# Patient Record
Sex: FEMALE | Race: WHITE | NOT HISPANIC OR LATINO | Employment: FULL TIME | URBAN - METROPOLITAN AREA
[De-identification: names, ages, dates, MRNs, and addresses within clinical notes are randomized per-mention and may not be internally consistent; named-entity substitution may affect disease eponyms.]

---

## 2023-02-06 PROBLEM — E03.9 ADULT HYPOTHYROIDISM: Status: ACTIVE | Noted: 2023-02-06

## 2023-02-06 PROBLEM — N93.8 DUB (DYSFUNCTIONAL UTERINE BLEEDING): Status: ACTIVE | Noted: 2023-02-06

## 2023-02-06 PROBLEM — E66.813 OBESITY, CLASS III, BMI 40-49.9 (MORBID OBESITY): Status: ACTIVE | Noted: 2023-02-06

## 2023-02-06 PROBLEM — E55.9 VITAMIN D DEFICIENCY: Status: ACTIVE | Noted: 2023-02-06

## 2023-02-06 PROBLEM — N97.0 INFERTILITY ASSOCIATED WITH ANOVULATION: Status: ACTIVE | Noted: 2023-02-06

## 2023-02-06 PROBLEM — N85.00 ENDOMETRIAL HYPERPLASIA: Status: ACTIVE | Noted: 2023-02-06

## 2023-02-06 PROBLEM — I82.409 DVT (DEEP VENOUS THROMBOSIS) (MULTI): Status: ACTIVE | Noted: 2023-02-06

## 2023-02-06 PROBLEM — T14.8XXA PULLED MUSCLE: Status: ACTIVE | Noted: 2023-02-06

## 2023-02-06 PROBLEM — N92.1 MENORRHAGIA WITH IRREGULAR CYCLE: Status: ACTIVE | Noted: 2023-02-06

## 2023-02-06 PROBLEM — R06.09 DYSPNEA ON EXERTION: Status: ACTIVE | Noted: 2023-02-06

## 2023-02-06 PROBLEM — R10.9 ABDOMINAL PAIN: Status: ACTIVE | Noted: 2023-02-06

## 2023-02-06 PROBLEM — F41.8 DEPRESSION WITH ANXIETY: Status: ACTIVE | Noted: 2023-02-06

## 2023-02-06 PROBLEM — E66.01 OBESITY, CLASS III, BMI 40-49.9 (MORBID OBESITY) (MULTI): Status: ACTIVE | Noted: 2023-02-06

## 2023-02-06 PROBLEM — E28.2 PCOS (POLYCYSTIC OVARIAN SYNDROME): Status: ACTIVE | Noted: 2023-02-06

## 2023-02-06 PROBLEM — N91.5 OLIGOMENORRHEA: Status: ACTIVE | Noted: 2023-02-06

## 2023-02-06 PROBLEM — D64.9 ANEMIA: Status: ACTIVE | Noted: 2023-02-06

## 2023-02-06 PROBLEM — I26.99 MULTIPLE PULMONARY EMBOLI (MULTI): Status: ACTIVE | Noted: 2023-02-06

## 2023-02-06 PROBLEM — R00.0 TACHYCARDIA: Status: ACTIVE | Noted: 2023-02-06

## 2023-02-06 PROBLEM — N92.6 IRREGULAR MENSES: Status: ACTIVE | Noted: 2023-02-06

## 2023-02-06 RX ORDER — METFORMIN HYDROCHLORIDE 500 MG/1
TABLET, EXTENDED RELEASE ORAL
COMMUNITY
Start: 2022-08-15 | End: 2023-10-27 | Stop reason: ALTCHOICE

## 2023-02-06 RX ORDER — ENOXAPARIN SODIUM 150 MG/ML
INJECTION SUBCUTANEOUS
COMMUNITY
Start: 2022-08-08 | End: 2023-10-27 | Stop reason: ALTCHOICE

## 2023-02-06 RX ORDER — PNV 119/IRON FUM/FOLIC ACID 29 MG-1 MG
TABLET ORAL
COMMUNITY
Start: 2019-11-14

## 2023-02-06 RX ORDER — ALBUTEROL SULFATE 90 UG/1
AEROSOL, METERED RESPIRATORY (INHALATION)
COMMUNITY
Start: 2019-09-25

## 2023-02-06 RX ORDER — NORETHINDRONE 5 MG/1
1 TABLET ORAL 3 TIMES DAILY
COMMUNITY
Start: 2022-03-30 | End: 2023-10-27 | Stop reason: ALTCHOICE

## 2023-02-06 RX ORDER — MEDROXYPROGESTERONE ACETATE 10 MG/1
1 TABLET ORAL DAILY
COMMUNITY
Start: 2022-08-08 | End: 2023-10-27 | Stop reason: ALTCHOICE

## 2023-04-18 RX ORDER — PROGESTERONE 100 MG/1
CAPSULE ORAL
COMMUNITY
End: 2023-10-27 | Stop reason: ALTCHOICE

## 2023-04-18 RX ORDER — ESTRADIOL 1 MG/1
1 TABLET ORAL DAILY
COMMUNITY
Start: 2023-03-03 | End: 2023-10-27 | Stop reason: ALTCHOICE

## 2023-04-18 RX ORDER — PROGESTERONE 200 MG/1
1 CAPSULE ORAL NIGHTLY
COMMUNITY
Start: 2023-03-09 | End: 2023-10-27 | Stop reason: ALTCHOICE

## 2023-04-25 ENCOUNTER — OFFICE VISIT (OUTPATIENT)
Dept: PRIMARY CARE | Facility: CLINIC | Age: 43
End: 2023-04-25
Payer: COMMERCIAL

## 2023-04-25 VITALS
DIASTOLIC BLOOD PRESSURE: 70 MMHG | HEIGHT: 68 IN | TEMPERATURE: 98.3 F | WEIGHT: 293 LBS | BODY MASS INDEX: 44.41 KG/M2 | OXYGEN SATURATION: 99 % | SYSTOLIC BLOOD PRESSURE: 110 MMHG | HEART RATE: 109 BPM

## 2023-04-25 DIAGNOSIS — E66.01 OBESITY, CLASS III, BMI 40-49.9 (MORBID OBESITY) (MULTI): ICD-10-CM

## 2023-04-25 DIAGNOSIS — E28.2 PCOS (POLYCYSTIC OVARIAN SYNDROME): ICD-10-CM

## 2023-04-25 DIAGNOSIS — Z12.11 ENCOUNTER FOR SCREENING FOR MALIGNANT NEOPLASM OF COLON: ICD-10-CM

## 2023-04-25 DIAGNOSIS — E55.9 VITAMIN D DEFICIENCY: ICD-10-CM

## 2023-04-25 DIAGNOSIS — D50.9 IRON DEFICIENCY ANEMIA, UNSPECIFIED IRON DEFICIENCY ANEMIA TYPE: ICD-10-CM

## 2023-04-25 DIAGNOSIS — F41.8 DEPRESSION WITH ANXIETY: ICD-10-CM

## 2023-04-25 DIAGNOSIS — Z00.00 ANNUAL PHYSICAL EXAM: Primary | ICD-10-CM

## 2023-04-25 DIAGNOSIS — E03.9 ADULT HYPOTHYROIDISM: ICD-10-CM

## 2023-04-25 DIAGNOSIS — Z12.31 BREAST CANCER SCREENING BY MAMMOGRAM: ICD-10-CM

## 2023-04-25 DIAGNOSIS — I26.99 MULTIPLE PULMONARY EMBOLI (MULTI): ICD-10-CM

## 2023-04-25 LAB
CALCIDIOL (25 OH VITAMIN D3) (NG/ML) IN SER/PLAS: 28 NG/ML
THYROTROPIN (MIU/L) IN SER/PLAS BY DETECTION LIMIT <= 0.05 MIU/L: 1.33 MIU/L (ref 0.44–3.98)

## 2023-04-25 PROCEDURE — 83540 ASSAY OF IRON: CPT

## 2023-04-25 PROCEDURE — 83036 HEMOGLOBIN GLYCOSYLATED A1C: CPT

## 2023-04-25 PROCEDURE — 80053 COMPREHEN METABOLIC PANEL: CPT

## 2023-04-25 PROCEDURE — 84443 ASSAY THYROID STIM HORMONE: CPT

## 2023-04-25 PROCEDURE — 85027 COMPLETE CBC AUTOMATED: CPT

## 2023-04-25 PROCEDURE — 99396 PREV VISIT EST AGE 40-64: CPT | Performed by: NURSE PRACTITIONER

## 2023-04-25 PROCEDURE — 80061 LIPID PANEL: CPT

## 2023-04-25 PROCEDURE — 82306 VITAMIN D 25 HYDROXY: CPT

## 2023-04-25 PROCEDURE — 36415 COLL VENOUS BLD VENIPUNCTURE: CPT | Performed by: NURSE PRACTITIONER

## 2023-04-25 ASSESSMENT — PAIN SCALES - GENERAL: PAINLEVEL: 0-NO PAIN

## 2023-04-25 NOTE — PROGRESS NOTES
Subjective   Patient ID: Radha Gomez is a 42 y.o. female who presents for Annual Exam.    HPI     Review of Systems    Objective   LMP 02/25/2023     Physical Exam    Assessment/Plan

## 2023-04-25 NOTE — PROGRESS NOTES
"Subjective   Patient ID: Radha Gomez is a 42 y.o. female who presents for Annual Exam.    HPI  Pleasant established patient here with her  for Annual exam  New c/o hemorrhoids, can be uncomfortable and bleeding at times, currently not a concern, interested in ways to manage. Low fiber diet, mostly take out. Water 32 oz, little to no exercise    Reports mom was recently diagnosed with Dementia and has declined rapidly over the last 4 months.     Currently pursuing IVF, have decided not to pursue letrozole due to risk of congenital defects. It was recommended she begin a weight loss program for better chances of a healthy pregnancy. Discussed healthy meal options, motivation with starting with small changes. Plate method and adding water and movement to her days. Also discussed planning in advance.  Will refer to Nutritionist, she will take a list of meals over a two week period.     Follows with Reproductive Endocrinology, Hematology for Hx of PE taking Lovenox      Works remotely    Diet mostly take out, not motivated to cook  Caffeine drinks soda  Water 32 oz  Exercise no  Non-smoker  Alcohol No      Eye exam >5   Dental exam 2022  Mammogram due      Review of Systems  Review of Systems   Constitutional: Negative.    HENT: Negative.     Respiratory: Negative.     Cardiovascular: Negative.    Gastrointestinal: Negative.    Genitourinary: Negative.    Musculoskeletal: Negative.    Psychiatric/Behavioral: Negative.     All other systems reviewed and are negative.    .vsVisit Vitals  /70 (BP Location: Left arm, Patient Position: Sitting, BP Cuff Size: Large adult)   Pulse 109   Temp 36.8 °C (98.3 °F) (Temporal)   Ht 1.727 m (5' 8\")   Wt 137 kg (302 lb 12.8 oz)   LMP 02/25/2023   SpO2 99%   BMI 46.04 kg/m²   OB Status Having periods   Smoking Status Never Assessed   BSA 2.56 m²         Objective   Physical Exam  Physical Exam  Vitals reviewed.   Constitutional:       General: She is active. "   HENT:      Head: Normocephalic and atraumatic.   Eyes:      Extraocular Movements: Extraocular movements intact.      Pupils: Pupils are equal, round, and reactive to light.   Cardiovascular:      Rate and Rhythm: Normal rate and regular rhythm.   Pulmonary:      Effort: Pulmonary effort is normal.      Breath sounds: Normal breath sounds.   Abdominal:      General: Bowel sounds are normal.   Musculoskeletal:         General: Normal range of motion.      Cervical back: Neck supple.   Skin:     General: Skin is warm and dry.      Capillary Refill: Capillary refill takes less than 2 seconds.   Neurological:      General: No focal deficit present.      Mental Status: She is alert.     Assessment/Plan   Problem List Items Addressed This Visit       Adult hypothyroidism    Relevant Orders    TSH with reflex to Free T4 if abnormal    Depression with anxiety    Multiple pulmonary emboli (CMS/HCC)    Obesity, Class III, BMI 40-49.9 (morbid obesity) (CMS/HCC)    Relevant Orders    Hemoglobin A1C    Lipid Panel    Referral to Nutrition Services    PCOS (polycystic ovarian syndrome)    Relevant Orders    Hemoglobin A1C    Referral to Nutrition Services    Vitamin D deficiency    Relevant Orders    Vitamin D, Total    Anemia    Relevant Orders    Iron level    Annual physical exam - Primary    Relevant Orders    CBC    Comprehensive Metabolic Panel    Vitamin D, Total    TSH with reflex to Free T4 if abnormal    Hemoglobin A1C    Lipid Panel     Other Visit Diagnoses       Encounter for screening for malignant neoplasm of colon        Breast cancer screening by mammogram        Relevant Orders    BI mammo bilateral screening tomosynthesis

## 2023-04-25 NOTE — PATIENT INSTRUCTIONS
Constipation  Though occasional constipation is very common, some people experience chronic constipation that can interfere with their ability to go about their daily tasks.   Chronic constipation may also cause people to strain excessively in order to have a bowel movement.    Symptoms    Signs and symptoms of chronic constipation include:  Passing fewer than three stools a week   Having lumpy or hard stools   Straining to have bowel movements   Feeling as though there's a blockage in your rectum that prevents bowel movements   Feeling as though you can't completely empty the stool from your rectum   Needing help to empty your rectum, such as using your hands to press on your abdomen and using a finger to remove stool from your rectum  Constipation may be considered chronic if you've experienced two or more of these symptoms for the last three months.    Risk factors    Being an older adult   Being a woman   Being dehydrated   Eating a diet that's low in fiber   Getting little or no physical activity   Taking certain medications, including sedatives, opioid pain medications, some antidepressants or medications to lower blood pressure   Having a mental health condition such as depression or an eating disorder    Prevention    Include plenty of high-fiber foods in your diet, including beans, vegetables, fruits, whole grain cereals and bran.   Eat fewer foods with low amounts of fiber such as processed foods, and dairy and meat products.   Drink plenty of fluids.   Stay as active as possible and try to get regular exercise.   Try to manage stress.   Don't ignore the urge to pass stool.   Try to create a regular schedule for bowel movements, especially after a meal.   Make sure children who begin to eat solid foods get plenty of fiber in their diets.     offers a free caregiver course for people taking care of family members with dementia  It is called the Savvy Caregiver and will teach you about dementia, the  changes that come with it and how to manage them both physically and emotionally.  Classes are 2 hours once a week for 6 weeks.   Call [phone]    Useful resources  [facility]   [phone]    [facility] Division of Adult/Senior Services   [phone]    [employer]   [phone]    [facility]  [phone]    [facility]  [phone]

## 2023-04-26 LAB
ALANINE AMINOTRANSFERASE (SGPT) (U/L) IN SER/PLAS: 40 U/L (ref 7–45)
ALBUMIN (G/DL) IN SER/PLAS: 4.6 G/DL (ref 3.4–5)
ALKALINE PHOSPHATASE (U/L) IN SER/PLAS: 52 U/L (ref 33–110)
ANION GAP IN SER/PLAS: 15 MMOL/L (ref 10–20)
ASPARTATE AMINOTRANSFERASE (SGOT) (U/L) IN SER/PLAS: 21 U/L (ref 9–39)
BILIRUBIN TOTAL (MG/DL) IN SER/PLAS: 0.7 MG/DL (ref 0–1.2)
CALCIUM (MG/DL) IN SER/PLAS: 9.6 MG/DL (ref 8.6–10.6)
CARBON DIOXIDE, TOTAL (MMOL/L) IN SER/PLAS: 21 MMOL/L (ref 21–32)
CHLORIDE (MMOL/L) IN SER/PLAS: 108 MMOL/L (ref 98–107)
CHOLESTEROL (MG/DL) IN SER/PLAS: 159 MG/DL (ref 0–199)
CHOLESTEROL IN HDL (MG/DL) IN SER/PLAS: 28.4 MG/DL
CHOLESTEROL/HDL RATIO: 5.6
CREATININE (MG/DL) IN SER/PLAS: 0.88 MG/DL (ref 0.5–1.05)
ERYTHROCYTE DISTRIBUTION WIDTH (RATIO) BY AUTOMATED COUNT: 17.3 % (ref 11.5–14.5)
ERYTHROCYTE MEAN CORPUSCULAR HEMOGLOBIN CONCENTRATION (G/DL) BY AUTOMATED: 30.7 G/DL (ref 32–36)
ERYTHROCYTE MEAN CORPUSCULAR VOLUME (FL) BY AUTOMATED COUNT: 89 FL (ref 80–100)
ERYTHROCYTES (10*6/UL) IN BLOOD BY AUTOMATED COUNT: 4.71 X10E12/L (ref 4–5.2)
ESTIMATED AVERAGE GLUCOSE FOR HBA1C: 88 MG/DL
GFR FEMALE: 84 ML/MIN/1.73M2
GLUCOSE (MG/DL) IN SER/PLAS: 100 MG/DL (ref 74–99)
HEMATOCRIT (%) IN BLOOD BY AUTOMATED COUNT: 42 % (ref 36–46)
HEMOGLOBIN (G/DL) IN BLOOD: 12.9 G/DL (ref 12–16)
HEMOGLOBIN A1C/HEMOGLOBIN TOTAL IN BLOOD: 4.7 %
IRON (UG/DL) IN SER/PLAS: 45 UG/DL (ref 35–150)
LDL: ABNORMAL MG/DL (ref 0–99)
LEUKOCYTES (10*3/UL) IN BLOOD BY AUTOMATED COUNT: 6.2 X10E9/L (ref 4.4–11.3)
NRBC (PER 100 WBCS) BY AUTOMATED COUNT: 0 /100 WBC (ref 0–0)
PLATELETS (10*3/UL) IN BLOOD AUTOMATED COUNT: 230 X10E9/L (ref 150–450)
POTASSIUM (MMOL/L) IN SER/PLAS: 4.3 MMOL/L (ref 3.5–5.3)
PROTEIN TOTAL: 6.7 G/DL (ref 6.4–8.2)
SODIUM (MMOL/L) IN SER/PLAS: 140 MMOL/L (ref 136–145)
TRIGLYCERIDE (MG/DL) IN SER/PLAS: 591 MG/DL (ref 0–149)
UREA NITROGEN (MG/DL) IN SER/PLAS: 11 MG/DL (ref 6–23)
VLDL: ABNORMAL MG/DL (ref 0–40)

## 2023-05-02 DIAGNOSIS — E55.9 VITAMIN D DEFICIENCY: Primary | ICD-10-CM

## 2023-05-02 RX ORDER — ERGOCALCIFEROL 1.25 MG/1
50000 CAPSULE ORAL
Qty: 4 CAPSULE | Refills: 0 | Status: SHIPPED | OUTPATIENT
Start: 2023-05-02 | End: 2023-06-27

## 2023-09-14 LAB
HEPATITIS B VIRUS SURFACE AG PRESENCE IN SERUM: NONREACTIVE
HEPATITIS C VIRUS AB PRESENCE IN SERUM: NONREACTIVE
HIV 1/ 2 AG/AB SCREEN: NONREACTIVE

## 2023-09-15 LAB — SYPHILIS TOTAL AB: NONREACTIVE

## 2023-09-19 LAB — ANTI-MULLERIAN HORMONE (AMH): 4.85 NG/ML

## 2023-09-21 PROBLEM — Z00.00 ANNUAL PHYSICAL EXAM: Status: RESOLVED | Noted: 2023-04-25 | Resolved: 2023-09-21

## 2023-09-21 PROBLEM — T14.8XXA PULLED MUSCLE: Status: RESOLVED | Noted: 2023-02-06 | Resolved: 2023-09-21

## 2023-09-21 PROBLEM — R10.9 ABDOMINAL PAIN: Status: RESOLVED | Noted: 2023-02-06 | Resolved: 2023-09-21

## 2023-09-21 RX ORDER — ASPIRIN 325 MG
50000 TABLET, DELAYED RELEASE (ENTERIC COATED) ORAL WEEKLY
COMMUNITY
End: 2023-10-27 | Stop reason: ALTCHOICE

## 2023-09-21 RX ORDER — ENOXAPARIN SODIUM 100 MG/ML
INJECTION SUBCUTANEOUS
COMMUNITY
Start: 2019-06-07 | End: 2023-10-27 | Stop reason: ALTCHOICE

## 2023-09-21 RX ORDER — DOCUSATE SODIUM 100 MG/1
CAPSULE, LIQUID FILLED ORAL
COMMUNITY
Start: 2015-07-10

## 2023-09-21 RX ORDER — FERROUS SULFATE 325(65) MG
TABLET, DELAYED RELEASE (ENTERIC COATED) ORAL
COMMUNITY
Start: 2015-07-10

## 2023-09-21 RX ORDER — MEDROXYPROGESTERONE ACETATE 5 MG/1
TABLET ORAL
COMMUNITY
Start: 2019-10-28 | End: 2023-10-27 | Stop reason: ALTCHOICE

## 2023-09-21 RX ORDER — LEVOTHYROXINE SODIUM 75 UG/1
TABLET ORAL
COMMUNITY
End: 2023-10-27 | Stop reason: ALTCHOICE

## 2023-10-10 ENCOUNTER — PHARMACY VISIT (OUTPATIENT)
Dept: PHARMACY | Facility: CLINIC | Age: 43
End: 2023-10-10
Payer: COMMERCIAL

## 2023-10-10 PROCEDURE — RXMED WILLOW AMBULATORY MEDICATION CHARGE

## 2023-10-20 ENCOUNTER — APPOINTMENT (OUTPATIENT)
Dept: ENDOCRINOLOGY | Facility: CLINIC | Age: 43
End: 2023-10-20
Payer: COMMERCIAL

## 2023-10-27 ENCOUNTER — OFFICE VISIT (OUTPATIENT)
Dept: PRIMARY CARE | Facility: CLINIC | Age: 43
End: 2023-10-27
Payer: COMMERCIAL

## 2023-10-27 VITALS
DIASTOLIC BLOOD PRESSURE: 80 MMHG | SYSTOLIC BLOOD PRESSURE: 130 MMHG | BODY MASS INDEX: 46.19 KG/M2 | OXYGEN SATURATION: 97 % | HEART RATE: 118 BPM | WEIGHT: 293 LBS | TEMPERATURE: 97.6 F

## 2023-10-27 DIAGNOSIS — E66.01 MORBID OBESITY (MULTI): ICD-10-CM

## 2023-10-27 DIAGNOSIS — F41.8 DEPRESSION WITH ANXIETY: ICD-10-CM

## 2023-10-27 DIAGNOSIS — Z91.89 AT RISK FOR COPING DIFFICULTY: Primary | ICD-10-CM

## 2023-10-27 DIAGNOSIS — N97.0 INFERTILITY ASSOCIATED WITH ANOVULATION: ICD-10-CM

## 2023-10-27 PROCEDURE — 1036F TOBACCO NON-USER: CPT | Performed by: NURSE PRACTITIONER

## 2023-10-27 PROCEDURE — 3008F BODY MASS INDEX DOCD: CPT | Performed by: NURSE PRACTITIONER

## 2023-10-27 PROCEDURE — 99214 OFFICE O/P EST MOD 30 MIN: CPT | Performed by: NURSE PRACTITIONER

## 2023-10-27 ASSESSMENT — ENCOUNTER SYMPTOMS
SHORTNESS OF BREATH: 0
EYES NEGATIVE: 1
PALPITATIONS: 0
CHILLS: 0
ALLERGIC/IMMUNOLOGIC NEGATIVE: 1
ENDOCRINE NEGATIVE: 1
FATIGUE: 0
GASTROINTESTINAL NEGATIVE: 1
DEPRESSION: 0
MUSCULOSKELETAL NEGATIVE: 1
NEUROLOGICAL NEGATIVE: 1
COUGH: 0
FEVER: 0
HEMATOLOGIC/LYMPHATIC NEGATIVE: 1

## 2023-10-27 ASSESSMENT — PAIN SCALES - GENERAL: PAINLEVEL: 0-NO PAIN

## 2023-10-27 NOTE — PATIENT INSTRUCTIONS
You are not alone  Everyone has struggles in life  Many people are out there to listen, and help, and not  you  Never be afraid to ask    Here is a list of Mental Health Resources;    Suicide and Crisis Hotline  DIAL 988    Mobile Crisis  [phone]   or Text 4HOPE to 707174    Suicide Prevention Lifeline  1-101.345.1426    Recovery Resources   [address]  [phone]  Substance Abuse  Mental Health  Psychiatric Emergency Walk In clinic    [facility]  [address]   [phone]   Substance abuse   Psychiatric Emergency Walk In clinic      Top rated On Line Resources; Just Google the name for the link to follow    [employer]     [employer]    Teen Counseling    Pride Counseling     Grief is universal. At some point in everyone's life, there will be at least one encounter with grief.   It may be from the death of a loved one, the loss of a job, the end of a relationship, or any other change that alters life as you know it.    Grief is also very personal. It's not very neat or linear. It doesn't follow any timelines or schedules.  You may cry, become angry, withdraw, feel empty.   None of these things are unusual or wrong.   Everyone grieves differently, but there are some commonalities in the stages and the order of feelings experienced during grief.    The five stages of grief are:  denial  anger  bargaining  depression  acceptance    Not everyone will experience all five stages, and you may not go through them in this order.      Stage 1: Denial  Grief is an overwhelming emotion. It's not unusual to respond to the intense and often sudden feelings by pretending the loss or change isn't happening. Denying it gives you time to more gradually absorb the news and begin to process it. This is a common defense mechanism and helps numb you to the intensity of the situation.    As you move out of the denial stage, however, the emotions you've been hiding will begin to  "rise. You'll be confronted with a lot of sorrow you've denied. That is also part of the journey of grief, but it can be difficult.    Stage 2: Anger  Where denial may be considered a coping mechanism, anger is a masking effect. Anger is hiding many of the emotions and pain that you carry. This anger may be redirected at other people, such as the person who , your ex, or your old boss. You may even aim your anger at inanimate objects.    While your rational brain knows the object of your anger isn't to blame, your feelings in that moment are too intense to feel that.    Anger may mask itself in feelings like bitterness or resentment. It may not be clear-cut fury or rage. Not everyone will experience this stage, and some may linger here. As the anger subsides, however, you may begin to think more rationally about what's happening and feel the emotions you've been pushing aside.    Stage 3: Bargaining  During grief, you may feel vulnerable and helpless. In those moments of intense emotions, it's not uncommon to look for ways to regain control or to want to feel like you can affect the outcome of an event. In the bargaining stage of grief, you may find yourself creating a lot of \"what if\" and \"if only\" statements.    It's also not uncommon for [employer] individuals to try to make a deal or promise to God or a higher power in return for healing or relief from the grief and pain. Bargaining is a line of defense against the emotions of grief. It helps you postpone the sadness, confusion, or hurt.    Stage 4: Depression  Whereas anger and bargaining can feel very \"active,\" depression may feel like a \"quiet\" stage of grief.    In the early stages of loss, you may be running from the emotions, trying to stay a step ahead of them. By this point, however, you may be able to embrace and work through them in a more healthful manner. You may also choose to isolate yourself from others in order to fully cope with the " "loss.    That doesn't mean, however, that depression is easy or well defined. Like the other stages of grief, depression can be difficult and messy. It can feel overwhelming. You may feel foggy, heavy, and confused.    Depression may feel like the inevitable landing point of any loss. However, if you feel stuck here or can't seem to move past this stage of grief, talk with a mental health expert. A therapist can help you work through this period of coping.    Stage 5: Acceptance  Acceptance is not necessarily a happy or uplifting stage of grief. It doesn't mean you've moved past the grief or loss. It does, however, mean that you've accepted it and have come to understand what it means in your life now.    You may feel very different in this stage. That's entirely expected. You've had a major change in your life, and that upends the way you feel about many things. Look to acceptance as a way to see that there may be more good days than bad, but there may still be bad - and that's OK    Where did the stages of grief come from?  In 1969, a [place]-American psychiatrist named Beryl Madrid wrote in her book \"On Death and Dying\" that grief could be divided into five stages. Her observations came from years of working with terminally ill individuals.  Her theory of grief became known as the Aj-Benoit model. While it was originally devised for people who were ill, these stages of grief have been adapted for other experiences with loss, too.    "

## 2023-10-27 NOTE — PROGRESS NOTES
I was present with the APRN student who participated in the documentation of this note. I have personally seen and re-examined the patient and performed the medical decision-making components (assessment and plan of care). I have reviewed the APRN student documentation and verified the findings in the note as written with additions or exceptions as stated in the body of this note  Sanaz Danielson [facility]

## 2023-10-27 NOTE — PROGRESS NOTES
Subjective   Patient ID: Radha Gomez is a 42 y.o. female who presents for Follow-up (6mo FUV).    HPI     Pleasant 42 y.o. female with past medical history for PCOS, infertility, and depression with anxiety presents for 6 month follow up.    Infertility - Continues pursuing IVF. IVF retrieval requires BMI = 44 or less. Current BMI 46. Saw dietician, unable to locate records. Reports she has all the tools she just needs to use them.  Discussed healthy meal options and gave her information on the mediterranean diet. Discussed reducing carbohydrate intake and implementing more vegetables and fruit in her diet. Also discussed meal prepping in advance. She doesn't get much exercise besides walking at work. Recommended walking/exercising and using 5 lb hand weights most days of the week.    Depression with anxiety - She is experiencing some anxiety and depression from losing her mom due to dementia this past year, trouble coping. Has good support system.  She denies SI or HI. Recommended referral to psychology to help with coping, grief, medical process.     Review of Systems   Constitutional:  Negative for chills, fatigue and fever.   HENT: Negative.     Eyes: Negative.    Respiratory:  Negative for cough and shortness of breath.    Cardiovascular:  Negative for chest pain and palpitations.   Gastrointestinal: Negative.    Endocrine: Negative.    Genitourinary: Negative.    Musculoskeletal: Negative.    Skin: Negative.    Allergic/Immunologic: Negative.    Neurological: Negative.    Hematological: Negative.    Psychiatric/Behavioral:          HPI       Objective   /80 (BP Location: Left arm, Patient Position: Sitting, BP Cuff Size: Adult)   Pulse (!) 118   Temp 36.4 °C (97.6 °F) (Temporal)   Wt 142 kg (312 lb 12.8 oz)   LMP 10/21/2023 (Approximate)   SpO2 97%   BMI 46.19 kg/m²     Physical Exam  Constitutional:       Appearance: Normal appearance. She is obese.   Cardiovascular:      Rate and Rhythm:  Normal rate and regular rhythm.      Pulses: Normal pulses.      Heart sounds: Normal heart sounds.   Pulmonary:      Effort: Pulmonary effort is normal.      Breath sounds: Normal breath sounds.   Neurological:      Mental Status: She is alert and oriented to person, place, and time.   Psychiatric:      Comments: Crying, depressed, and anxious         Assessment/Plan   Problem List Items Addressed This Visit             ICD-10-CM    Depression with anxiety F41.8    Relevant Orders    Referral to Psychology    Infertility associated with anovulation N97.0    Relevant Orders    Referral to Psychology  Recommended mediteraanean diet   Recommended meal prepping  Recommended walking and exercising     Other Visit Diagnoses         Codes    At risk for coping difficulty    -  Primary Z91.89    Relevant Orders    Referral to Psychology          BMI 46  Follows with Nutrition  Handouts provided and reviewed with patient, discussed

## 2023-11-03 ENCOUNTER — PHARMACY VISIT (OUTPATIENT)
Dept: PHARMACY | Facility: CLINIC | Age: 43
End: 2023-11-03
Payer: COMMERCIAL

## 2023-11-03 DIAGNOSIS — I26.99 MULTIPLE PULMONARY EMBOLI (MULTI): Primary | ICD-10-CM

## 2023-11-03 PROCEDURE — RXMED WILLOW AMBULATORY MEDICATION CHARGE

## 2023-11-03 RX ORDER — ENOXAPARIN SODIUM 150 MG/ML
INJECTION SUBCUTANEOUS
Qty: 60 ML | Refills: 6 | Status: SHIPPED | OUTPATIENT
Start: 2023-11-03 | End: 2024-11-02

## 2023-11-10 ENCOUNTER — SOCIAL WORK (OUTPATIENT)
Dept: BEHAVIORAL HEALTH | Facility: CLINIC | Age: 43
End: 2023-11-10
Payer: COMMERCIAL

## 2023-11-10 DIAGNOSIS — Z91.89 AT RISK FOR COPING DIFFICULTY: ICD-10-CM

## 2023-11-10 DIAGNOSIS — F41.8 DEPRESSION WITH ANXIETY: ICD-10-CM

## 2023-11-10 DIAGNOSIS — F41.1 GAD (GENERALIZED ANXIETY DISORDER): ICD-10-CM

## 2023-11-10 DIAGNOSIS — F33.2 SEVERE EPISODE OF RECURRENT MAJOR DEPRESSIVE DISORDER, WITHOUT PSYCHOTIC FEATURES (MULTI): ICD-10-CM

## 2023-11-10 DIAGNOSIS — N97.0 INFERTILITY ASSOCIATED WITH ANOVULATION: ICD-10-CM

## 2023-11-10 PROCEDURE — 90791 PSYCH DIAGNOSTIC EVALUATION: CPT

## 2023-11-10 NOTE — PROGRESS NOTES
Solution-Focused Therapy Initial Assessment    Radha oGmez, a 42 y.o. female, for initial evaluation visit.  Patient is referred by PCP, KP Herrera    Session Time:   Start: 9:03 AM  End: 10:15 AM  Accompanied by: Self    Reason for Visit / Chief Complaint  Chief Complaint   Patient presents with    Psychiatric Evaluation     Reason for Referral    Pt currently experiencing Anxiety , Depression, infertility, and health issues, with reported history of Suicidal Ideation   Managed by Medication? No      History (Timeline of symptom onset and Family)  PCP was concerned that pt not handling stress well.  Mom, who was pt's main support,  in May, had Dementia, got firm diagnosis in Feb, started declining fast.  Was in a facility, pt and fam were working on getting her home when passed away.  Pt thought she was coping well, going through grief stages, but realizes she may not be doing that well.  Now lacking support--talking to  and/or friend isn't the same.  Additionally, pt struggling with infertility for about 4 years, been trying to conceive for 6 years, and work for  is stressful.     Pt reports suicidal ideation as recent as Saturday--sometimes feels like there's no recourse, nothing will help, existence is stupid, but then start thinking about people in life, distracts by shopping and such.  Is mostly passive ideation, but has had the same idea for a plan for years--would use her anticoagulant and walk far from where people would find her and cut herself, and let time do its thing.  Ideation goes back to beginning of high school.  Have always just moved on, though.  Pt feels it gives her some control to say “here's what I will do if it hurts too much”.  Her responsibility to others stops her, knows it would hurt other people for her to hurt herself, is future thinking-- committed to wanting a child.    Pt reports:  Sleep Disturbance:  Low Energy:  Frequent Headaches:  Chest  Pains/Palpitations:  Anxious/Tense/Worried: Depressed/Sad:  Angry/Irritable:  Social Withdrawal:  Crying Spells:  Hopelessness:  Poor Self-Image/Guilt:  Thoughts of Death or Dying:  Obsessions:      Pt has a long history of suicide ideation, with protective factors.  Has done therapy before--when 2017 or 19, expressed to  that if they can't have children, pt had a plan to end it all.  So, pt went and talked to therapist, who pt feels “turned it back on me” to find solutions, felt like she was failing him bc she was not coming up with solutions, so stopped therapy.  No meds.      Maternal & Sibling mental and behavioral health history of Bipolar Disorder , Alcohol Abuse.    Present stressors:   Mother's recent passing, infertility, other health issues, lack of full support system              Pt would like to work on:    Not needing counseling--“to have more days of wanting to do the day”, as opposed to going back to bed.       PSYCHOSOCIAL HISTORIES    CURRENT LIVING SITUATION   Immediate household/family:  Lives with , James, and 2 cats--Connor, and Admiral Cullen KATEGeovanny Mayo III.   for 6 years, together for 12.    Family and Social support network:  , father, friend, but she knows she has a hard time showing emotion.  Father is like her, not very expressive     Sikh/Spiritual orientation:   None, really. Raised Orthodox.  Researched other religions, would classify self as Rosas.  [employer] partner “ghosted” her during pandemic, has felt disconnected.  Not pursuing right now.    Gender Identity/Pronouns/Sexual orientation:  She/her; has PCOS--can make her think she is not who she knows herself to be. Don't feel “normal”, would like to be “normal”.  Diagnosed about 8 years ago.    Recent losses or deaths:  Mom passed due to dementia in May.  Was an intellectual, so “probably for the best” that it happened quickly.  A role-playing online friend drowned around same time as mom's  passing as well.      MENTAL HEALTH HISTORY     What precipitated this most recent episode?   PCP was concerned that pt not handling stress well.  Mom, who was pt's main support,  in May, had Dementia, got firm diagnosis in Feb, started declining fast.  Was in a facility, pt and fam were working on getting her home when passed away.  Pt thought she was coping well, going through grief stages, but realizes she may not be doing that well.  Now lacking support--talking to  and/or friend isn't the same.  Additionally, pt struggling with infertility for about 4 years, been trying to conceive for 6 years, and work for  is stressful.      Additionally, pt upset that her  is not as supportive as she would like him to be.  Has made side comments about her eating habits--pt has to lose weight for infertility treatment.    Current Providers?    None:      Prior mental health treatment:  Has done therapy before--when  or 19, expressed to  that if they can't have children, pt had a plan to end it all.  So, pt went and talked to therapist, who pt feels “turned it back on me” to find solutions, felt like she was failing him bc she was not coming up with solutions, so stopped therapy.  No meds.      Acute mental health symptoms:  Suicidal Ideation:  Ideation as recent as Saturday--sometimes feels like there's no recourse, nothing will help, existence is stupid, but then start thinking about people in life, distracts by shopping and such.  Has had the same plan for years--would use her anticoagulant and walk far from where people would find her and cut herself, and let time do its thing.  Ideation goes back to beginning of high school.  Have always just moved on, though.  Pt feels it gives her some control to say “here's what I will do if it hurts too much”.  Her responsibility to others stops her, knows it would hurt other people for her to hurt herself, is future thinking-- committed to wanting a  child.  Homicidal Ideation:  None reported.  Psychosis:  None reported.      SIGNIFICANT MEDICAL HX?  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  To go forward with IVF, need to be put under.  To do that in office, have to be under a certain BMI--pt is 10 lbs from being able to do that.   Can feel issues/stiffness in her joints, would like to be more active and energetic.  Have always been bigger, knows she should get in better shape.      Illness/Mental Health impacts functioning?  Yes  Pt reports:  Current Somatic Symptoms:   Sleep Disturbance:   Always had difficulties with sleep, will wake and.  Finds self ruminating, with racing thoughts.  Wakes up most nights, sometimes feels rested.  Gets about 4-5 hours on average.   Low Energy:   Low   Frequent Headaches:   Come and go--3-4 times a month   Chest Pains/Palpitations:   Not without activity   Current Affective Symptoms:   Anxious/Tense/Worried:    Constantly    Depressed/Sad:   Very often   Angry/Irritable:   Counterbalance of holding back emotion.   Current Behavioral Symptoms:   Social Withdrawal:   yes   Crying Spells:   Holds back emotion.   Current Cognitive Symptoms:   Hopelessness:   Sometimes wonders “what's it all for?”   Poor Self-Image/Guilt:   Yes   Thoughts of Death or Dying:   Has regular ideation, but good protective factors.   Obsessions:   Skin-picking when have any kind of skin blemish.       SUBSTANCE ABUSE HX:        None reported     Substance Abuse Treatment? N/A  Risky Behaviors? None reported      FAMILY HISTORY & MENTAL/BEHAVIORAL HEALTH HISTORY  Composition of Family of Origin   Mother and father, .  One Brother--a half-brother who is 10 years older.  Had another half-brother who committed suicide, dad never really got to be a part of his life, impactful.  No Sisters.  Lived with: Mom, dad, brother  until about 7 or 8 in [place], then went to live with dad, had hard time as teen.  Moved to [place] at start of high school.  After high school, moved to [place] to pursue acting, parents suggested that pt move to [place] to be brother's --was good, watched him grow up, was hard to leave him.  Quality of Current Familial Relationships:  Good.  Didn't get along with mom growing up, once she started to get help, they started to get closer    Family Mental/Behavioral Health History  Mother:  Bipolar Disorder, diagnosed in 30s-40s, sought help    Brother(s): Alcohol Abuse, won't get help.  Was court ordered to do so for a while.     Hx OF TRAUMA/ABUSE?   Home situation: Parents had stress dealing with brother, pt stayed quiet, tried to stay out of the way    WORK HX  Currently employed?  Yes, Full Time      Occupation:  Health Care at     Present Status:  Working.        MENTAL STATUS EXAM:  General appearance & grooming:  Appropriate  Motor activity:  Appropriate   Behavior:  Cooperative  Adaptive Equipment:  N/A  Speech:  Appropriate, Clear   Mood:  Euthymic  Affect:   Mood Congruent, Normal Range   Thought process:  Appropriate, logical   Thought content:  Appropriate   Cognition:  No impairment, Alert & Oriented x 3   Insight:  Aware of problem(s)  Eye contact:  Average   Judgment:  Judgment intact   Interview behavior:  Appropriate  Hallucinations:  None  Delusions:  Absent       PATIENT DISCUSSION/SUMMARY    PLAN:  Pt diagnosis:  Major Depressive Disorder, Generalized Anxiety Disorder   Would like to work on:  Not needing counseling--“to have more days of wanting to do the day”, as opposed to going back to bed.  Pt has agreed to Brief Counseling with a Cognitive Behavioral primary focus.  IOP discussed and recommended.  Pt would like to take some time to consider.  Follow-up appointment arranged for:  12/1/2023, 900, with a plan to meet every week after.

## 2023-11-16 PROBLEM — F33.2 SEVERE EPISODE OF RECURRENT MAJOR DEPRESSIVE DISORDER, WITHOUT PSYCHOTIC FEATURES (MULTI): Status: ACTIVE | Noted: 2023-11-16

## 2023-11-16 PROBLEM — F41.1 GAD (GENERALIZED ANXIETY DISORDER): Status: ACTIVE | Noted: 2023-11-16

## 2023-11-19 ENCOUNTER — OFFICE VISIT (OUTPATIENT)
Dept: URGENT CARE | Facility: CLINIC | Age: 43
End: 2023-11-19
Payer: COMMERCIAL

## 2023-11-19 VITALS
HEART RATE: 82 BPM | DIASTOLIC BLOOD PRESSURE: 94 MMHG | BODY MASS INDEX: 45.78 KG/M2 | TEMPERATURE: 98.7 F | OXYGEN SATURATION: 98 % | RESPIRATION RATE: 18 BRPM | WEIGHT: 293 LBS | SYSTOLIC BLOOD PRESSURE: 142 MMHG

## 2023-11-19 DIAGNOSIS — H66.91 RIGHT OTITIS MEDIA, UNSPECIFIED OTITIS MEDIA TYPE: Primary | ICD-10-CM

## 2023-11-19 PROCEDURE — 1036F TOBACCO NON-USER: CPT | Performed by: PHYSICIAN ASSISTANT

## 2023-11-19 PROCEDURE — 3008F BODY MASS INDEX DOCD: CPT | Performed by: PHYSICIAN ASSISTANT

## 2023-11-19 PROCEDURE — 99204 OFFICE O/P NEW MOD 45 MIN: CPT | Performed by: PHYSICIAN ASSISTANT

## 2023-11-19 RX ORDER — AZITHROMYCIN 250 MG/1
TABLET, FILM COATED ORAL
Qty: 6 TABLET | Refills: 0 | Status: SHIPPED | OUTPATIENT
Start: 2023-11-19 | End: 2023-12-01 | Stop reason: ALTCHOICE

## 2023-11-19 ASSESSMENT — ENCOUNTER SYMPTOMS
PSYCHIATRIC NEGATIVE: 1
CARDIOVASCULAR NEGATIVE: 1
COUGH: 0
ENDOCRINE NEGATIVE: 1
FEVER: 0
HEMATOLOGIC/LYMPHATIC NEGATIVE: 1
SORE THROAT: 0
GASTROINTESTINAL NEGATIVE: 1
NEUROLOGICAL NEGATIVE: 1
ALLERGIC/IMMUNOLOGIC NEGATIVE: 1
MUSCULOSKELETAL NEGATIVE: 1
EYES NEGATIVE: 1

## 2023-11-19 ASSESSMENT — PAIN SCALES - GENERAL: PAINLEVEL: 6

## 2023-11-19 NOTE — PROGRESS NOTES
Subjective   Patient ID: Radha Gomez is a 42 y.o. female.      History provided by:  Patient   used: No    Earache   Pertinent negatives include no coughing or sore throat.   This is a 42 yr old female here for right ear pain x 5 days. Clear nasal congestion this week. No sore throat, fever, cough, ear drainage or bleeding from the ear. Recent plane travel.    Review of Systems   Constitutional:  Negative for fever.   HENT:  Positive for congestion and ear pain. Negative for sore throat.    Eyes: Negative.    Respiratory:  Negative for cough.    Cardiovascular: Negative.    Gastrointestinal: Negative.    Endocrine: Negative.    Genitourinary: Negative.    Musculoskeletal: Negative.    Skin: Negative.    Allergic/Immunologic: Negative.    Neurological: Negative.    Hematological: Negative.    Psychiatric/Behavioral: Negative.     All other systems reviewed and are negative.    Past Medical History:   Diagnosis Date    Benign endometrial hyperplasia     Complex endometrial hyperplasia without atypia    Encounter for follow-up examination after completed treatment for conditions other than malignant neoplasm 09/25/2019    Hospital discharge follow-up    Encounter for gynecological examination (general) (routine) without abnormal findings 09/14/2017    Well woman exam with routine gynecological exam    Encounter for other general counseling and advice on procreation 11/14/2019    Encounter for preconception consultation    Other specified anxiety disorders 04/20/2022    Depression with anxiety    Personal history of diseases of the blood and blood-forming organs and certain disorders involving the immune mechanism 09/18/2020    History of anemia    Personal history of other diseases of the female genital tract 05/10/2018    History of amenorrhea    Personal history of other mental and behavioral disorders 09/25/2019    History of depression    Personal history of other specified conditions  10/08/2015    History of chest pain    Personal history of other venous thrombosis and embolism 11/15/2019    History of deep venous thrombosis    Personal history of pulmonary embolism 10/10/2019    Personal history of pulmonary embolism    Polycystic ovarian syndrome 05/20/2022    PCOS (polycystic ovarian syndrome)     Current Outpatient Medications on File Prior to Visit   Medication Sig Dispense Refill    albuterol 90 mcg/actuation inhaler Inhale.      docusate sodium (Colace) 100 mg capsule 100 mg 1 caps, ORAL, BID, Date: 07/10/2015 14:04:00      enoxaparin (Lovenox) 150 mg/mL injection INJECT 135MG (0.9ML) UNDER THE SKIN EVERY 12 HOURS 60 mL 6    ferrous sulfate 325 (65 Fe) MG EC tablet 325 mg 1 tabs, ORAL, DAILY, Date: 07/10/2015 14:03:00      iron ps complex/B12/folic acid (POLY-IRON 150 FORTE ORAL) Take by mouth twice a day.      metFORMIN XR (Glucophage-XR) 750 mg 24 hr tablet TAKE 2 TABLETS BY MOUTH ONCE DAILY WITH THE EVENING MEAL. 180 tablet 3    -iron fum-folic acid (Prenatal 19) 29 mg iron- 1 mg tablet Take by mouth.       No current facility-administered medications on file prior to visit.     Allergies   Allergen Reactions    Penicillins Unknown     BP (!) 142/94   Pulse 82   Temp 37.1 °C (98.7 °F)   Resp 18   Wt 141 kg (310 lb)   LMP 10/21/2023 (Approximate) Comment: pt states hx of PCOS  SpO2 98%   BMI 45.78 kg/m²   Objective   Physical Exam  Vitals and nursing note reviewed.   Constitutional:       Appearance: Normal appearance.   HENT:      Head: Normocephalic and atraumatic.      Right Ear: Ear canal normal.      Left Ear: Tympanic membrane and ear canal normal.      Ears:      Comments: Right ear-TM erythematous and dull     Mouth/Throat:      Mouth: Mucous membranes are moist.      Pharynx: No posterior oropharyngeal erythema.   Cardiovascular:      Rate and Rhythm: Normal rate and regular rhythm.   Pulmonary:      Effort: Pulmonary effort is normal.      Breath sounds: Normal  breath sounds.   Musculoskeletal:      Cervical back: Neck supple.   Lymphadenopathy:      Cervical: No cervical adenopathy.   Skin:     General: Skin is warm and dry.   Neurological:      General: No focal deficit present.      Mental Status: She is alert and oriented to person, place, and time.   Psychiatric:         Mood and Affect: Mood normal.         Behavior: Behavior normal.     Assessment:  Right OM    Plan:  Zpak as directed  OTC decongestant as directed  Pcp follow up this week if not improving or worsening  ER visit anytime 24/7 for acute worsening or changing condition

## 2023-11-19 NOTE — PATIENT INSTRUCTIONS
OTC decongestant as directed  Motrin or tylenol as directed for pain or fever  Pcp follow up this week if not improving or worsening  ER visit anytime 24/7 for acute worsening or changing condition

## 2023-12-01 ENCOUNTER — SOCIAL WORK (OUTPATIENT)
Dept: BEHAVIORAL HEALTH | Facility: CLINIC | Age: 43
End: 2023-12-01
Payer: COMMERCIAL

## 2023-12-01 ENCOUNTER — OFFICE VISIT (OUTPATIENT)
Dept: PRIMARY CARE | Facility: CLINIC | Age: 43
End: 2023-12-01
Payer: COMMERCIAL

## 2023-12-01 VITALS
OXYGEN SATURATION: 98 % | TEMPERATURE: 97 F | BODY MASS INDEX: 45.63 KG/M2 | DIASTOLIC BLOOD PRESSURE: 78 MMHG | WEIGHT: 293 LBS | SYSTOLIC BLOOD PRESSURE: 138 MMHG | HEART RATE: 89 BPM

## 2023-12-01 DIAGNOSIS — F41.1 GAD (GENERALIZED ANXIETY DISORDER): ICD-10-CM

## 2023-12-01 DIAGNOSIS — H66.90 ACUTE OTITIS MEDIA, UNSPECIFIED OTITIS MEDIA TYPE: Primary | ICD-10-CM

## 2023-12-01 DIAGNOSIS — F33.2 SEVERE EPISODE OF RECURRENT MAJOR DEPRESSIVE DISORDER, WITHOUT PSYCHOTIC FEATURES (MULTI): Primary | ICD-10-CM

## 2023-12-01 PROCEDURE — 90837 PSYTX W PT 60 MINUTES: CPT

## 2023-12-01 PROCEDURE — 99213 OFFICE O/P EST LOW 20 MIN: CPT | Performed by: NURSE PRACTITIONER

## 2023-12-01 PROCEDURE — 3008F BODY MASS INDEX DOCD: CPT | Performed by: NURSE PRACTITIONER

## 2023-12-01 PROCEDURE — 1036F TOBACCO NON-USER: CPT | Performed by: NURSE PRACTITIONER

## 2023-12-01 RX ORDER — CEFDINIR 300 MG/1
300 CAPSULE ORAL 2 TIMES DAILY
Qty: 20 CAPSULE | Refills: 0 | Status: SHIPPED | OUTPATIENT
Start: 2023-12-01 | End: 2023-12-11

## 2023-12-01 ASSESSMENT — ENCOUNTER SYMPTOMS
CONSTITUTIONAL NEGATIVE: 1
GASTROINTESTINAL NEGATIVE: 1
DEPRESSION: 0
PALPITATIONS: 0
SHORTNESS OF BREATH: 0

## 2023-12-01 ASSESSMENT — PAIN SCALES - GENERAL: PAINLEVEL: 0-NO PAIN

## 2023-12-01 NOTE — PROGRESS NOTES
"Solution-Focused Therapy Follow up Note  Session Time: 9:01 AM-10:04 AM      Patient  Radha Gomez is a 42 y.o. female, presenting for a follow-up visit.     An interactive audio and video telecommunication system which permits real time communications between the patient (at the originating site) and provider (at the distant site) was utilized to provide this telehealth service.     Verbal consent was requested and obtained from Radha Gomez on this date, 12/01/23 for a telehealth visit.     Session conducted virtually via HIPAA compliant video.  Patient was provided with informed consent.    Chief Complaint   Patient presents with    Depression    Anxiety     Session #1    Updates/Session Content  Pt update:  Dad introduced girlfriend at [employer].  Seems nice, she is a  herself, has Parkinson's.  A little hard to digest, but trying to be positive for dad.  Not as tearful.  Things still \"kind of a mess\", but having conversations with --both agree to prioritizing health, taking things as they come, instead of ruminating on \"what ifs\".  Not as many tearful moments since last session.   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    Looking at getting new work position, a \"microcertification\" called [employer] will be helpful.  Pt has paid for it, but hasn't completed it yet.  Pt has insecurities about not being able to get through/pass the course, even though understands her insecurities are somewhat irrational.  Wonders about how much she may not know that she will need to learn--\"cram into my head\".  Would it just be adding more to her plate?  Has some anxiety about it.  Feels a bit overwhelmed about all of the things she wants to do, worried that she prioritizes ineffectively: \"Everything that should be important to me, I don't prioritize at all\".  But also recognizes that she feels: \"If I'm not doing everything perfectly, what's the point in doing anything?\"  Tends to lead pt to the feeling " "that she is not \"good enough\".    Discussion and homework:  Discussed perfectionism, and its dangers.  Discussed goals pt has right now:   Microcertification to get new position at work  Health and weight, path to fertility.  10 lbs, but is about more than that.  Cleaning, organizing home  Processing loss of mother  Being supportive wife  Spiritual connection--haven't honored it, would like to start again    Discussed pt giving self credit for making any progress on any goal:  pt recognized that she has made progress on being a supportive wife.  Discussed approaching goals one at a time initially, building them on one another to be able to work on multiple goals at once.  Pt deciding to start on goal of reconnecting to spirituality, as it feels foundational.    Therapy components:  identifying key behaviors, activity monitoring, and identifying pleasant or meaningful activities  Identified automatic thoughts by noting and discussing in-session mood shift.      Interventions Provided: Solution Focused Therapy and Values Exploration  Assignment(s) Given  I asked the client to complete the following assignments: clear out and rebuild altar, with a goal of making progress on goals important to pt and re-establishing an important part of pt's support foundation to decrease anxiety, depression.     Response to Intervention: Pt engaged, open, receptive    Mental Status  General Appearance & Grooming: Appropriate  Behavior:  Appropriate  Mood: Normal  Sensorium/Cognition: No impairment and Alert & Oriented x 3    Additional Session Information  NA    Progress Made: Moderate      Follow Up / Next Appointment:  12/15/2023  "

## 2023-12-01 NOTE — PROGRESS NOTES
Subjective   Patient ID: Radha Gomez is a 42 y.o. female who presents for Cerumen Impaction.    HPI     Pleasant 42 year old female presents with acute otitis media.     Acute otitis media - 11/19/23 went to urgent care for right ear pain where she was noted to have otitis media of the right ear. She was prescribed a azithromycin. Told to take an OTC decongestant as directed as well. Today she notes no improvement. Hearing comes and goes and now noticing it on her left ear as well. She c/o some sinus congestion and rhinorrhea. Per exam, pus and effusion visualized in right ear canals. Left ear canal and TM irritated. She denies fever, chills, nausea, and vomiting. She is allergic to penicillins. Cefdinir prescribed. Patient instructed to take full course of abx and call with worsening symptoms or no signs of improvement.     Review of Systems   Constitutional: Negative.    HENT:          HPI   Respiratory:  Negative for shortness of breath.    Cardiovascular:  Negative for chest pain and palpitations.   Gastrointestinal: Negative.        Objective   /78 (BP Location: Left arm, Patient Position: Sitting)   Pulse 89   Temp 36.1 °C (97 °F)   Wt 140 kg (309 lb)   LMP 11/13/2023 (Approximate) Comment: pt states hx of PCOS  SpO2 98%   BMI 45.63 kg/m²     Physical Exam  Constitutional:       Appearance: Normal appearance.   HENT:      Head: Normocephalic and atraumatic.      Right Ear: A middle ear effusion is present.      Left Ear: Tympanic membrane and ear canal normal.      Ears:      Comments: Right ear pus  Left ear irritated and red     Nose: Nose normal.      Mouth/Throat:      Mouth: Mucous membranes are moist.      Pharynx: Oropharynx is clear.   Cardiovascular:      Rate and Rhythm: Normal rate and regular rhythm.      Pulses: Normal pulses.      Heart sounds: Normal heart sounds.   Pulmonary:      Effort: Pulmonary effort is normal.      Breath sounds: Normal breath sounds.   Neurological:       Mental Status: She is alert.         Assessment/Plan   Problem List Items Addressed This Visit             ICD-10-CM    Acute otitis media - Primary H66.90    Relevant Medications    cefdinir (Omnicef) 300 mg capsule

## 2023-12-01 NOTE — PROGRESS NOTES
I was present with the APRN student who participated in the documentation of this note. I have personally seen and re-examined the patient and performed the medical decision-making components (assessment and plan of care). I have reviewed the APRN student documentation and verified the findings in the note as written with additions or exceptions as stated in the body of this note  Sanaz Danielson [facility]

## 2023-12-15 ENCOUNTER — SOCIAL WORK (OUTPATIENT)
Dept: BEHAVIORAL HEALTH | Facility: CLINIC | Age: 43
End: 2023-12-15
Payer: COMMERCIAL

## 2023-12-15 DIAGNOSIS — F41.1 GAD (GENERALIZED ANXIETY DISORDER): ICD-10-CM

## 2023-12-15 DIAGNOSIS — F33.2 SEVERE EPISODE OF RECURRENT MAJOR DEPRESSIVE DISORDER, WITHOUT PSYCHOTIC FEATURES (MULTI): Primary | ICD-10-CM

## 2023-12-15 PROCEDURE — 90837 PSYTX W PT 60 MINUTES: CPT

## 2023-12-15 NOTE — PROGRESS NOTES
Solution-Focused Therapy Follow up Note  Session Time: 9:00 AM-10:05 AM      Patient  Radha Gomez is a 42 y.o. female, presenting for a follow-up visit.     An interactive audio and video telecommunication system which permits real time communications between the patient (at the originating site) and provider (at the distant site) was utilized to provide this telehealth service.     Verbal consent was requested and obtained from Radha Gomez on this date, 12/15/23 for a telehealth visit.     Session conducted virtually via HIPAA compliant video.  Patient was provided with informed consent.    Chief Complaint   Patient presents with    Depression    Anxiety     Session #2    Updates/Session Content  Pt update:  Pt doing pretty well on goals.    Started clearing altar, found crocheting projects started but didn't finish.  Pt wanting to finish them now!  Making good progress on altar.  And last weekend, started making cookies for people for [employer].  Was exhausting, but happy to do things for others.  Puts whole self into it--considers cooking as her craft, witchcraft.      Supportive wife--doing well.  Next goal to start regularly cooking dinner with .    Processing mom's death--happening organically, as pt discusses other things.    Dad invited pt out for her birthday on Saturday.  Will gf be coming?  Pt still not sure how she feels about her father dating.  Pt also reflecting on how her father is not behind pt and  having children later in life.    Discussion and homework:  Discussed the progress pt has made already with items above.  Next goal to work on: Organizing home--for baby, for moving one day, for just clearing out things.  Want to start with closet.  Discussed communicating with father about uncertainties and other feelings.  Pt understands and agrees, but not quite comfortable with talking to dad yet.    Therapy components:  identifying key behaviors and behavioral  activation  Monitored dysfunctional automatic thoughts by examining the evidence and identifiying cognitive errors.  Used problem-solving strategy.      Interventions Provided: Behavioral Activation, Solution Focused Therapy, Values Exploration, and Homework F/U  Assignment(s) Given  I asked the client to complete the following practice assignments: Altar finish, and start on floor zone in closet: laundry baskets, with a goal of organizing/cleaning up home.    Response to Intervention: Pt engaged, open, receptive    Mental Status  General Appearance & Grooming: Appropriate  Behavior:  Appropriate  Mood: Normal  Sensorium/Cognition: No impairment and Alert & Oriented x 3    Additional Session Information  NA    Progress Made: Moderate      Follow Up / Next Appointment:  1/5/2024

## 2024-01-05 ENCOUNTER — SOCIAL WORK (OUTPATIENT)
Dept: BEHAVIORAL HEALTH | Facility: CLINIC | Age: 44
End: 2024-01-05
Payer: COMMERCIAL

## 2024-01-05 DIAGNOSIS — F33.2 SEVERE EPISODE OF RECURRENT MAJOR DEPRESSIVE DISORDER, WITHOUT PSYCHOTIC FEATURES (MULTI): Primary | ICD-10-CM

## 2024-01-05 DIAGNOSIS — F41.1 GAD (GENERALIZED ANXIETY DISORDER): ICD-10-CM

## 2024-01-05 PROCEDURE — 90832 PSYTX W PT 30 MINUTES: CPT

## 2024-01-05 NOTE — PROGRESS NOTES
Solution-Focused Therapy Follow up Note  Session Time: 1:00 PM-1:27 PM      Patient  Radha Gomez is a 43 y.o. female, presenting for a follow-up visit.     An interactive audio and video telecommunication system which permits real time communications between the patient (at the originating site) and provider (at the distant site) was utilized to provide this telehealth service.     Verbal consent was requested and obtained from Radha Gomez on this date, 01/05/24 for a telehealth visit.     Session conducted virtually via HIPAA compliant video.  Patient was provided with informed consent.    Chief Complaint   Patient presents with    Depression    Anxiety     Session #3    Updates/Session Content  Pt update:  Not much to share right now.  Holidays were all right--didn't do much, has been ill through all of the holidays.  Hard to speak without coughing.    But bright spots--Good friend came out from [place], and Holiday of Lights coming--cold and dark, but light is coming.    Discussion and homework:  Did get altar together.  [place] was beautiful.  Didn't really start on closet much.  Pt planning to work on that next.      Therapy components:  identifying key behaviors      Interventions Provided: Solution Focused Therapy  Assignment(s) Given  Pt agreed to complete the following assignment: start work on closet.    Response to Intervention: Pt engaged, open, receptive    Mental Status  General Appearance & Grooming: Appropriate  Behavior:  Appropriate  Mood: Normal  Sensorium/Cognition: No impairment and Alert & Oriented x 3    Additional Session Information  Pt not feeling well--difficult to talk today, so session cut a bit short.    Progress Made: Minimum      Follow Up / Next Appointment:  1/19/2024

## 2024-01-09 RX ORDER — PROGESTERONE 200 MG/1
200 CAPSULE ORAL NIGHTLY
Qty: 90 CAPSULE | Refills: 0 | OUTPATIENT
Start: 2024-01-09 | End: 2025-01-08

## 2024-01-19 ENCOUNTER — SOCIAL WORK (OUTPATIENT)
Dept: BEHAVIORAL HEALTH | Facility: CLINIC | Age: 44
End: 2024-01-19
Payer: COMMERCIAL

## 2024-01-19 DIAGNOSIS — F33.2 SEVERE EPISODE OF RECURRENT MAJOR DEPRESSIVE DISORDER, WITHOUT PSYCHOTIC FEATURES (MULTI): Primary | ICD-10-CM

## 2024-01-19 DIAGNOSIS — F41.1 GAD (GENERALIZED ANXIETY DISORDER): ICD-10-CM

## 2024-01-19 PROCEDURE — 90837 PSYTX W PT 60 MINUTES: CPT

## 2024-01-19 NOTE — PSYCHOTHERAPY
Pt update:  Still got a weird cough, but feeling better, physically.    Pt found out that one of exterior walls is cracked, with cold air coming in    Has cleaned altar from [facility], has area prepped  Plans are delayed--dreams vs. Checking thhings off    Establishing routiens    Processing through death of omom, and dad moving on so quickly    Discussion and homework:  Making plans--got piles set up in closet

## 2024-01-19 NOTE — PROGRESS NOTES
"Solution-Focused Therapy Follow up Note  Session Time: 9:01 AM -10:01 AM       Patient  Radha Gomez is a 43 y.o. female, presenting for a follow-up visit.     An interactive audio and video telecommunication system which permits real time communications between the patient (at the originating site) and provider (at the distant site) was utilized to provide this telehealth service.     Verbal consent was requested and obtained from Radha Gomez on this date, 01/19/24 for a telehealth visit.     Session conducted virtually via HIPAA compliant video.  Patient was provided with informed consent.    Chief Complaint   Patient presents with    Follow-up    Depression    Anxiety     Session #4    Updates/Session Content  Pt update:  Still got a weird cough, but feeling better, physically.    Has cleaned altar from [employer], has area prepped to set up for next season.   Needs to get to work on it, but wants to move altar first. But to do that, needs to clean closet first...Plans are delayed.  Feels like this happens often.  So many things pt wants to do, so many ideas...but has a list of actual things to do.  Thinking about which is more important--dreaming or checking things off?    Pt has been establishing morning and nighttime routines that are going pretty well so far, there are a few things pt is still working on getting incorporated into her routines.    Still processing through death of mom and processing through dad moving on so quickly with another woman.  Having a hard time with it.    Discussion and homework:  Pt has been making \"paper plans\" of things she wants to do, has gotten a little bit done in closet, setting up workable piles.    Discussed the utility of viewing checking off things as tasks to do to reach dreams.  Discussed organizing thoughts/tasks so won't get caught up trying to change too many things at one time.    Discussed difficulty of having conversation with dad about how she is feeling.  " "    Therapy components:  identifying key behaviors and activity scheduling  Monitored dysfunctional automatic thoughts by identifiying cognitive errors and weighing pros and cons or advantages and disadvantages.  Used problem-solving strategy.      Interventions Provided: Interpersonal Therapy, Motivational Interviewing, Solution Focused Therapy, and Values Exploration  Assignment(s) Given  Pt agreed to complete the following assignments: 1) Will start \"to do\" piles in closet, 2) when ready, will pick one unfinished thing from list of routines to start working on until completed to satisfaction.    Response to Intervention: Pt open, engaged, receptive    Mental Status  General Appearance & Grooming: Appropriate  Behavior:  Appropriate  Mood: Normal  Sensorium/Cognition: No impairment and Alert & Oriented x 3    Additional Session Information  NA    Progress Made: Moderate      Follow Up / Next Appointment:  2/2/2024  "

## 2024-02-02 ENCOUNTER — SOCIAL WORK (OUTPATIENT)
Dept: BEHAVIORAL HEALTH | Facility: CLINIC | Age: 44
End: 2024-02-02
Payer: COMMERCIAL

## 2024-02-02 DIAGNOSIS — F41.1 GAD (GENERALIZED ANXIETY DISORDER): ICD-10-CM

## 2024-02-02 DIAGNOSIS — F33.2 SEVERE EPISODE OF RECURRENT MAJOR DEPRESSIVE DISORDER, WITHOUT PSYCHOTIC FEATURES (MULTI): Primary | ICD-10-CM

## 2024-02-02 PROCEDURE — 90837 PSYTX W PT 60 MINUTES: CPT

## 2024-02-02 NOTE — PROGRESS NOTES
"Solution-Focused Therapy Follow up Note  Session Time: 10:07 AM -11:08 AM       Patient  Radha Gomez is a 43 y.o. female, presenting for a follow-up visit.     An interactive audio and video telecommunication system which permits real time communications between the patient (at the originating site) and provider (at the distant site) was utilized to provide this telehealth service.     Verbal consent was requested and obtained from Radha Gomez on this date, 02/02/24 for a telehealth visit.     Session conducted virtually via HIPAA compliant video.  Patient was provided with informed consent.    Chief Complaint   Patient presents with    Depression    Anxiety     Session #5    Updates/Session Content  Pt update:  Doing ok, week was pretty rough.  Had ritual last night--starting new season.  Trying to let go, start fresh.  Dad is donating mom's stuff--20+ bags--dad's gf is helping him do it.  Pt feels ambivalent, but doesn't feel free to just call dad and talk about mom when having a hard time. Keeps waiting for a time to talk to him, but someone else is always present.  Pt not sure what she would say to him...    Grief about mom is really strong sometimes.  Will that ever go away?  Reflecting on mom's life--she was naturally self-centered, created rifts with family members.  Pt felt like nothing she did was ever good enough.  Continues to somewhat affect how she feels about herself now, what she does now.  Tends to think things like, \"would this have made my mother proud of me?\"    Discussion and homework:  Pt making headway in closet!  Hoping to clean out entire bedroom before next session.  Discussed creating opportunities to speak with father rather than waiting for them, if that is something that pt really wants to do. Discussed grief as a continual process that needs to be able to continue as and when needed.  Gave pt space to discuss mom, acknowledged complexity of emotions, encouraged pt to consider " her perception vs. reality about mom's approval or lack thereof, but also discussed danger of dwelling in a past that cannot be changed.    Therapy components:  identifying key behaviors  Monitored dysfunctional automatic thoughts by examining the evidence and identifiying cognitive errors.  Used problem-solving strategy.    Interventions Provided: Acceptance & Commitment Therapy, Solution Focused Therapy, Values Exploration, and Grief Work  Assignment(s) Given  Pt agreed to complete the following assignments: keep working on closet, with a goal of getting things done.    Response to Intervention: Open, Engaged, and Receptive    Mental Status  General Appearance & Grooming: Appropriate  Behavior:  Appropriate  Mood: Normal, appropriate, but tearful  Sensorium/Cognition: No impairment and Alert & Oriented x 3    Additional Session Information  NA    Progress Made: Moderate    Follow Up / Next Appointment:  2/23/2024

## 2024-02-13 ENCOUNTER — OFFICE VISIT (OUTPATIENT)
Dept: HEMATOLOGY/ONCOLOGY | Facility: CLINIC | Age: 44
End: 2024-02-13
Payer: COMMERCIAL

## 2024-02-13 VITALS
DIASTOLIC BLOOD PRESSURE: 91 MMHG | RESPIRATION RATE: 20 BRPM | HEART RATE: 104 BPM | TEMPERATURE: 97.5 F | OXYGEN SATURATION: 98 % | SYSTOLIC BLOOD PRESSURE: 139 MMHG

## 2024-02-13 DIAGNOSIS — D68.51 HETEROZYGOUS FACTOR V LEIDEN MUTATION (MULTI): ICD-10-CM

## 2024-02-13 DIAGNOSIS — D68.59 HYPERCOAGULABLE STATE (MULTI): ICD-10-CM

## 2024-02-13 DIAGNOSIS — I26.99 PULMONARY EMBOLISM, UNSPECIFIED CHRONICITY, UNSPECIFIED PULMONARY EMBOLISM TYPE, UNSPECIFIED WHETHER ACUTE COR PULMONALE PRESENT (MULTI): ICD-10-CM

## 2024-02-13 PROCEDURE — 1036F TOBACCO NON-USER: CPT | Performed by: INTERNAL MEDICINE

## 2024-02-13 PROCEDURE — 3008F BODY MASS INDEX DOCD: CPT | Performed by: INTERNAL MEDICINE

## 2024-02-13 PROCEDURE — 99214 OFFICE O/P EST MOD 30 MIN: CPT | Performed by: INTERNAL MEDICINE

## 2024-02-13 ASSESSMENT — PAIN SCALES - GENERAL: PAINLEVEL: 0-NO PAIN

## 2024-02-13 NOTE — PROGRESS NOTES
LOCATION:  [facility].     HEMATOLOGY & ONCOLOGY PROBLEMS:  1. Coagulopathy.         a. History of right-leg DVT in 2007.         b. Maintained on Coumadin for 6 months.         c. History of bilateral pulmonary embolism and left-leg DVT in 2015.         d. Maintained on Xarelto, later transitioned to Lovenox due to excessive uterine bleeding.         e. Bilateral pulmonary embolism in Sept 2019 (compliance/ dosage issue with Lovenox).         f.  Restarted on anticoagulation with Lovenox.      CHIEF COMPLAINT:    The patient is in the clinic today for management of coagulopathy and history of recurrent pulmonary embolisms and DVT and continuation of anticoagulation.                HISTORY:   Ms. Gomez is a 41 year old pleasant female with coagulapathy and recurrent bilateral pulmonary embolism. She initially had an episode of right leg DVT in 2007 after prolonged drive back and forth to [place]. She was maintain on Coumadin for 6 months.  She did well for almost 8 years but had a sudden onset shortness of breath in 2015 and further evaluation confirmed bilateral pulmonary embolisms. She was maintained on Xarelto since 2015, which was changed to daily SQ lovenox 70 mg daily due to recurrent  uterine bleeding.  She underwent dilation and curettage at [facility] on 8/22/19 and did not resume her lovenox afterwards. After a week, she presented to the [place] ER with complaints of severe dyspnea on 9/15/19. Chest X-ray was negative for acute cardiopulmonary  process. CT-angiogram revealed a large PE in right pulmonary artery, bilateral lobar and segmental pulmonary emboli, possible developing right heart strain, and subtle ground-glass infiltrate in the medial left lower lobe.  Duplex US was negative for  lower extremity DVT. She has been maintained on therapeutic Lovenox since then. Family history is significant for her paternal grandmother and her brother having history of thromboembolic  complications. Hypercoagulable workup in the past has been unremarkable  except for the finding of elevated factor VIII levels.      INTERVAL HISTORY:  She denies any new complaints other than chronic issues with pain at injection sites.  She is still trying to get pregnant and is following with endocrine fertility for in vitro fertilization process.  She has been advised significant weight loss before she can proceed with the in vitro fertilization process.     PAST MEDICAL HISTORY:   1. Hypothyroidism.   2. Amenorrhea.   3. Anemia.   4. Depression/Anxiety.   5. Obesity.   6. Polycystic ovarian disease (PCOS).   7. Tachycardia.   8. History of DVT and PE.   9. History of cholecystectomy.      SOCIAL HISTORY:    for 11 years and lives in [place]. Nonsmoker. Nonalcoholic. She works as a . Born in [place], [place] and raised in [place], [place], [place], and [place].      FAMILY HISTORY:    Mother is 69 y/o and father is 67 y/o, alive and well. Has 1 half-brother. Has no children. Paternal grandmother had a history of DVTs. No other specific history of bleeding, clotting or malignant disorder in the family.     REVIEW OF SYSTEMS:  Pertinent finding as per the history above. All other systems have been reviewed and generally negative and noncontributory.     ALLERGY & MEDICATIONS:  Allergies and latest outpatient medications list were reviewed in the EMR.    VITAL SIGNS  BSA: There is no height or weight on file to calculate BSA.  BP (!) 139/91   Pulse 104   Temp 36.4 °C (97.5 °F) (Temporal)   Resp 20   SpO2 98%     PHYSICAL EXAMINATION:  Detailed examination not done.    LAB RESULTS:  CBC's, CMP etc. were unremarkable in August 2023.     ASSESSMENT & PLAN:  1. Coagulopathy. Please refer to the details of initial presentation and management as outlined above. In summary, she initially had an episode of right leg DVT  in 2007 after prolonged drive back and forth to [place]. She was  maintain on Coumadin for 6 months. She did well for almost 8 years but had sudden onset of anxiety attack/shortness of breath in 2015 and further evaluation confirmed bilateral pulmonary  embolisms. She was maintained on Xarelto since 2015, which was changed to daily SQ lovenox 70 mg daily due to recurrent uterine bleeding.  She underwent dilation and curettage at [facility] on 8/22/19 and did not resume her lovenox afterwards. After a week,  she presented to the [place] ER with complaints of severe dyspnea on 9/15/19.  CT-angiogram revealed a large PE in right pulmonary artery, bilateral lobar and segmental pulmonary emboli, possible developing right heart strain, and subtle ground-glass infiltrate  in the medial left lower lobe.  Duplex US was negative for lower extremity DVT. She was placed on therapeutic Lovenox at 135mg twice a day. Family history is significant for her paternal grandmother having history of thromboembolic complications. Hypercoagulable  workup in the past has been unremarkable except for the finding of elevated factor VIII levels.      She will continue with anticoagulation with Lovenox at the current dose and schedule (1 mg/day bid). Overall, she is tolerating it better than Xarelto.  She is still trying to get pregnant. Coumadin and DOACs are contraindicated during pregnancy and as  such she will have to continue with Lovenox.  We will continue to monitor her closely.      2. Follow up:  Patient will return to the clinic in 6 months.  Advised to contact us immediately if there are any new questions or problems.     This note has been transcribed using Dragon voice recognition system and there is a possibility of unintentional typing misprints.

## 2024-02-17 PROCEDURE — RXMED WILLOW AMBULATORY MEDICATION CHARGE

## 2024-02-20 ENCOUNTER — PHARMACY VISIT (OUTPATIENT)
Dept: PHARMACY | Facility: CLINIC | Age: 44
End: 2024-02-20
Payer: COMMERCIAL

## 2024-02-23 ENCOUNTER — SOCIAL WORK (OUTPATIENT)
Dept: BEHAVIORAL HEALTH | Facility: CLINIC | Age: 44
End: 2024-02-23
Payer: COMMERCIAL

## 2024-02-23 DIAGNOSIS — F41.1 GAD (GENERALIZED ANXIETY DISORDER): ICD-10-CM

## 2024-02-23 DIAGNOSIS — F33.2 SEVERE EPISODE OF RECURRENT MAJOR DEPRESSIVE DISORDER, WITHOUT PSYCHOTIC FEATURES (MULTI): Primary | ICD-10-CM

## 2024-02-23 PROCEDURE — 90837 PSYTX W PT 60 MINUTES: CPT

## 2024-02-23 NOTE — PROGRESS NOTES
"Solution-Focused Therapy Follow up Note  Session Time: 2:02 PM-3:23 PM      Patient  Radha Gomez is a 43 y.o. female, presenting for a follow-up visit.     An interactive audio and video telecommunication system which permits real time communications between the patient (at the originating site) and provider (at the distant site) was utilized to provide this telehealth service.     Verbal consent was requested and obtained from Radha Gomez on this date, 02/23/24 for a telehealth visit.     Session conducted virtually via HIPAA compliant video.  Patient was provided with informed consent.    Chief Complaint   Patient presents with    Depression    Anxiety     Session #6    Updates/Session Content  Pt update:  Have been ups and downs.   With work, feeling unsatisfied with productivity.  Always taken more pride in quality--Quality is still great.  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 Pt can acknowledge that objectively she is doing fine, as no one else seems to feel that there is an issue.  Learning Epic has slowed everything down, though.  Daily quota is sometimes easy to reach, and other times, seems impssible.      Pt admits to feeling like she has an issue with motivation sometimes:  \"If I don't like doing something, I just won't\".  One part of her says that she needs to change, do something different (like get a new job), but is skeptical about her ability to do a new job when feels she is not performing in current one.    Starting to exercise.   Reading a new book that is prompting her to think about writing letters to her mother.  Not sure if she should start that.  Has gotten mixed opinions from people.    Discussion and homework:  Started work for getting credential.  Discussed self-care, pt currently doing nothing just for herself, something that she enjoys to break up the monotony of just working and taking car of " home.  Maybe writing??  There is a story that she abandoned a while ago that she could like to pick back up in addition to writing to her mother.  Will consider starting to write again.    Therapy components:  identifying key behaviors and identifying pleasant or meaningful activities  Monitored dysfunctional automatic thoughts by weighing pros and cons or advantages and disadvantages.  Used problem-solving strategy.    Interventions Provided: Motivational Interviewing, Solution Focused Therapy, Strengths Exploration, and Values Exploration  Assignment(s) Given  Pt agreed to complete the following assignments: Give some thought to writing again, with a goal of increasing self-care, decreasing anxiety and depression.    Response to Intervention: Open, Engaged, and Receptive    Mental Status  General Appearance & Grooming: Appropriate  Behavior:  Appropriate  Mood: Normal  Sensorium/Cognition: No impairment and Alert & Oriented x 3    Additional Session Information  NA    Progress Made: Moderate    Follow Up / Next Appointment:  3/15/2024

## 2024-03-01 ENCOUNTER — APPOINTMENT (OUTPATIENT)
Dept: PRIMARY CARE | Facility: CLINIC | Age: 44
End: 2024-03-01
Payer: COMMERCIAL

## 2024-03-03 NOTE — PSYCHOTHERAPY
"Have been ups and downs.   With work, feeling unsatisfied with productivity.  Always taken more pride in quality--Quality is still great.  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 Pt can acknowledge that objectively she is doing fine, as no one else seems to feel that there is an issue.  Learning Epic has slowed everything down, though.  Daily quota is sometimes easy to reach, and other times, seems impssible.    Pt admits to feeling like she has an issue with motivation sometimes:  \"If I don't like doing something, I just won't\".  One part of her says that she needs to change, do something different (like get a new job), but is skeptical about her ability to do a new job when feels she is not performing in current one.    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      Starting to exercise.   Reading a new book that is prompting her to think about writing letters to her mother.  Mom in law said it was morbid.    Discussion and homework:  Started work for getting credential   "

## 2024-03-15 ENCOUNTER — SOCIAL WORK (OUTPATIENT)
Dept: BEHAVIORAL HEALTH | Facility: CLINIC | Age: 44
End: 2024-03-15
Payer: COMMERCIAL

## 2024-03-15 DIAGNOSIS — F41.1 GAD (GENERALIZED ANXIETY DISORDER): ICD-10-CM

## 2024-03-15 DIAGNOSIS — F33.2 SEVERE EPISODE OF RECURRENT MAJOR DEPRESSIVE DISORDER, WITHOUT PSYCHOTIC FEATURES (MULTI): Primary | ICD-10-CM

## 2024-03-15 PROCEDURE — 90837 PSYTX W PT 60 MINUTES: CPT

## 2024-03-15 NOTE — PROGRESS NOTES
"Solution-Focused Therapy Follow up Note  Session Time: 1:01 PM-1:56 PM      Patient  Radha Gomez is a 43 y.o. female, presenting for a follow-up visit.     An interactive audio and video telecommunication system which permits real time communications between the patient (at the originating site) and provider (at the distant site) was utilized to provide this telehealth service.     Verbal consent was requested and obtained from Radha Gomez on this date, 03/15/24 for a telehealth visit.     Session conducted virtually via HIPAA compliant video.  Patient was provided with informed consent.    Chief Complaint   Patient presents with    Depression    Anxiety     Session #7    Updates/Session Content  Pt update:  Had to put cat, Connor, down this week.  Noticed she wasn't feeling well over the weekend, took her to vet, found mass and fluid in her lungs.  Hadn't been eating, etc.   Connor's death has basically overshadowed everything.    Week has been up and down.  Feel a lot like did after mom --all of the tasks of death. Once things were quiet, really started thinking.  and pt talked about going to shelters to get new cat, but may be too soon.    Have not yet gotten ashes back.      Spring Equinox holiday on Tuesday--[place], time of spring, renewal, regrowth--planting season--what to gather for the year.  Want to plant some seeds.  Focusing on plants and growth and yard.    Discussion and homework:  Gave pt space to talk about cat, Connor, and process of her death.  Discussed grief and importance of allowing self to go through the process, discussed the balance of emotion (\"positive\" vs. \"negative\").    Therapy components:  identifying key behaviors  Monitored dysfunctional automatic thoughts by weighing pros and cons or advantages and disadvantages.  Used problem-solving strategy.    Interventions Provided: Acceptance & Commitment Therapy, Solution Focused Therapy, and Grief Work  Assignment(s) " Given  Pt agreed to complete the following assignments: Suggested that pt find a way to honor memory of Connor.    Response to Intervention: Open, Engaged, and Receptive    Mental Status  General Appearance & Grooming: Appropriate  Behavior:  Appropriate  Mood: Normal  Sensorium/Cognition: No impairment and Alert & Oriented x 3    Additional Session Information  NA    Progress Made: Gaining Insight    Follow Up / Next Appointment:  3/29/2024

## 2024-03-29 ENCOUNTER — SOCIAL WORK (OUTPATIENT)
Dept: BEHAVIORAL HEALTH | Facility: CLINIC | Age: 44
End: 2024-03-29
Payer: COMMERCIAL

## 2024-03-29 DIAGNOSIS — F33.2 SEVERE EPISODE OF RECURRENT MAJOR DEPRESSIVE DISORDER, WITHOUT PSYCHOTIC FEATURES (MULTI): Primary | ICD-10-CM

## 2024-03-29 DIAGNOSIS — F41.1 GAD (GENERALIZED ANXIETY DISORDER): ICD-10-CM

## 2024-03-29 PROCEDURE — 90837 PSYTX W PT 60 MINUTES: CPT

## 2024-03-29 NOTE — PROGRESS NOTES
Solution-Focused Therapy Follow up Note  Session Time: 1:03 PM-2:05 PM      Patient  Radha Gomez is a 43 y.o. female, presenting for a follow-up visit.     An interactive audio and video telecommunication system which permits real time communications between the patient (at the originating site) and provider (at the distant site) was utilized to provide this telehealth service.     Verbal consent was requested and obtained from Radha Gomez on this date, 03/29/24 for a telehealth visit.     Session conducted virtually via HIPAA compliant video.  Patient was provided with informed consent.    Chief Complaint   Patient presents with    MDD (Major Depressive Disorder)    Anxiety     Session #8    Updates/Session Content  Pt update:  Wants to get [employer] certification before vacation in June to [place].  Everything is finally arranged.    Planning 's practicum--possibility of him going out of town for it.  They would have to move, at least for a short time.    Has been running her tarot cards, kept getting 10 of swords, with death (rock bottom) , means back-stabbing, pt wondering what it means, what is in store for her.  Pt discussing practice of tarot cards, attempts to establish it as a part of her daily routine.    Discussion and homework:  Discussed pt's practice of using tarot cards, what it means to her, how she would like to incorporate it into her life.     Therapy components:  identifying key behaviors, identifying pleasant or meaningful activities, and activity scheduling  Monitored dysfunctional automatic thoughts by weighing pros and cons or advantages and disadvantages.  Used problem-solving strategy.    Interventions Provided: Problem Solving Treatment, Solution Focused Therapy, Strengths Exploration, and Values Exploration  Assignment(s) Given  Pt agreed to complete the following assignments: Card pulling every morning, with a goal of setting the tone for every day.    Response to  Intervention: Open, Engaged, and Receptive    Mental Status  General Appearance & Grooming: Appropriate  Behavior:  Appropriate  Mood: Normal  Sensorium/Cognition: No impairment and Alert & Oriented x 3    Additional Session Information  NA    Progress Made: Gaining Insight    Follow Up / Next Appointment:  5/2/2024

## 2024-04-12 ENCOUNTER — APPOINTMENT (OUTPATIENT)
Dept: BEHAVIORAL HEALTH | Facility: CLINIC | Age: 44
End: 2024-04-12
Payer: COMMERCIAL

## 2024-04-16 PROCEDURE — RXMED WILLOW AMBULATORY MEDICATION CHARGE

## 2024-04-16 ASSESSMENT — PROMIS GLOBAL HEALTH SCALE
RATE_AVERAGE_FATIGUE: MILD
RATE_PHYSICAL_HEALTH: FAIR
CARRYOUT_PHYSICAL_ACTIVITIES: MODERATELY
CARRYOUT_SOCIAL_ACTIVITIES: GOOD
RATE_GENERAL_HEALTH: FAIR
RATE_MENTAL_HEALTH: GOOD
RATE_SOCIAL_SATISFACTION: FAIR
EMOTIONAL_PROBLEMS: SOMETIMES
RATE_QUALITY_OF_LIFE: GOOD
RATE_AVERAGE_PAIN: 4

## 2024-04-17 ENCOUNTER — OFFICE VISIT (OUTPATIENT)
Dept: PRIMARY CARE | Facility: CLINIC | Age: 44
End: 2024-04-17
Payer: COMMERCIAL

## 2024-04-17 VITALS
BODY MASS INDEX: 43.4 KG/M2 | HEART RATE: 108 BPM | OXYGEN SATURATION: 97 % | DIASTOLIC BLOOD PRESSURE: 76 MMHG | HEIGHT: 69 IN | WEIGHT: 293 LBS | SYSTOLIC BLOOD PRESSURE: 122 MMHG | TEMPERATURE: 97.3 F

## 2024-04-17 DIAGNOSIS — Z00.00 ANNUAL PHYSICAL EXAM: Primary | ICD-10-CM

## 2024-04-17 DIAGNOSIS — F33.2 SEVERE EPISODE OF RECURRENT MAJOR DEPRESSIVE DISORDER, WITHOUT PSYCHOTIC FEATURES (MULTI): ICD-10-CM

## 2024-04-17 DIAGNOSIS — F41.1 GAD (GENERALIZED ANXIETY DISORDER): ICD-10-CM

## 2024-04-17 DIAGNOSIS — Z12.31 BREAST CANCER SCREENING BY MAMMOGRAM: ICD-10-CM

## 2024-04-17 DIAGNOSIS — E66.01 MORBID OBESITY (MULTI): ICD-10-CM

## 2024-04-17 DIAGNOSIS — E55.9 VITAMIN D DEFICIENCY: ICD-10-CM

## 2024-04-17 DIAGNOSIS — D50.9 IRON DEFICIENCY ANEMIA, UNSPECIFIED IRON DEFICIENCY ANEMIA TYPE: ICD-10-CM

## 2024-04-17 DIAGNOSIS — D50.8 IRON DEFICIENCY ANEMIA SECONDARY TO INADEQUATE DIETARY IRON INTAKE: ICD-10-CM

## 2024-04-17 DIAGNOSIS — E28.2 PCOS (POLYCYSTIC OVARIAN SYNDROME): ICD-10-CM

## 2024-04-17 DIAGNOSIS — E03.9 ADULT HYPOTHYROIDISM: ICD-10-CM

## 2024-04-17 LAB
ALBUMIN SERPL BCP-MCNC: 4.5 G/DL (ref 3.4–5)
ALP SERPL-CCNC: 60 U/L (ref 33–110)
ALT SERPL W P-5'-P-CCNC: 56 U/L (ref 7–45)
ANION GAP SERPL CALC-SCNC: 18 MMOL/L (ref 10–20)
AST SERPL W P-5'-P-CCNC: 28 U/L (ref 9–39)
BILIRUB SERPL-MCNC: 0.9 MG/DL (ref 0–1.2)
BUN SERPL-MCNC: 12 MG/DL (ref 6–23)
CALCIUM SERPL-MCNC: 10.1 MG/DL (ref 8.6–10.6)
CHLORIDE SERPL-SCNC: 104 MMOL/L (ref 98–107)
CHOLEST SERPL-MCNC: 172 MG/DL (ref 0–199)
CHOLESTEROL/HDL RATIO: 5.7
CO2 SERPL-SCNC: 23 MMOL/L (ref 21–32)
CREAT SERPL-MCNC: 0.82 MG/DL (ref 0.5–1.05)
EGFRCR SERPLBLD CKD-EPI 2021: >90 ML/MIN/1.73M*2
ERYTHROCYTE [DISTWIDTH] IN BLOOD BY AUTOMATED COUNT: 16.4 % (ref 11.5–14.5)
EST. AVERAGE GLUCOSE BLD GHB EST-MCNC: 74 MG/DL
GLUCOSE SERPL-MCNC: 104 MG/DL (ref 74–99)
HBA1C MFR BLD: 4.2 %
HCT VFR BLD AUTO: 47.6 % (ref 36–46)
HDLC SERPL-MCNC: 30.1 MG/DL
HGB BLD-MCNC: 14.7 G/DL (ref 12–16)
LDLC SERPL CALC-MCNC: ABNORMAL MG/DL
MCH RBC QN AUTO: 31.7 PG (ref 26–34)
MCHC RBC AUTO-ENTMCNC: 30.9 G/DL (ref 32–36)
MCV RBC AUTO: 103 FL (ref 80–100)
NON HDL CHOLESTEROL: 142 MG/DL (ref 0–149)
NRBC BLD-RTO: 0 /100 WBCS (ref 0–0)
PLATELET # BLD AUTO: 232 X10*3/UL (ref 150–450)
POTASSIUM SERPL-SCNC: 4.7 MMOL/L (ref 3.5–5.3)
PROT SERPL-MCNC: 6.8 G/DL (ref 6.4–8.2)
RBC # BLD AUTO: 4.63 X10*6/UL (ref 4–5.2)
SODIUM SERPL-SCNC: 140 MMOL/L (ref 136–145)
TRIGL SERPL-MCNC: 849 MG/DL (ref 0–149)
VLDL: ABNORMAL
WBC # BLD AUTO: 6.9 X10*3/UL (ref 4.4–11.3)

## 2024-04-17 PROCEDURE — 1036F TOBACCO NON-USER: CPT | Performed by: NURSE PRACTITIONER

## 2024-04-17 PROCEDURE — 84443 ASSAY THYROID STIM HORMONE: CPT

## 2024-04-17 PROCEDURE — 36415 COLL VENOUS BLD VENIPUNCTURE: CPT

## 2024-04-17 PROCEDURE — 3008F BODY MASS INDEX DOCD: CPT | Performed by: NURSE PRACTITIONER

## 2024-04-17 PROCEDURE — 82607 VITAMIN B-12: CPT

## 2024-04-17 PROCEDURE — 80053 COMPREHEN METABOLIC PANEL: CPT

## 2024-04-17 PROCEDURE — 99396 PREV VISIT EST AGE 40-64: CPT | Performed by: NURSE PRACTITIONER

## 2024-04-17 PROCEDURE — 82306 VITAMIN D 25 HYDROXY: CPT

## 2024-04-17 PROCEDURE — 83036 HEMOGLOBIN GLYCOSYLATED A1C: CPT

## 2024-04-17 PROCEDURE — 85027 COMPLETE CBC AUTOMATED: CPT

## 2024-04-17 PROCEDURE — 80061 LIPID PANEL: CPT

## 2024-04-17 ASSESSMENT — PATIENT HEALTH QUESTIONNAIRE - PHQ9
1. LITTLE INTEREST OR PLEASURE IN DOING THINGS: NOT AT ALL
2. FEELING DOWN, DEPRESSED OR HOPELESS: NOT AT ALL
SUM OF ALL RESPONSES TO PHQ9 QUESTIONS 1 AND 2: 0

## 2024-04-17 ASSESSMENT — PAIN SCALES - GENERAL: PAINLEVEL: 0-NO PAIN

## 2024-04-17 NOTE — PROGRESS NOTES
"Subjective   Patient ID: Radha Gomez is a 43 y.o. female who presents for Annual Exam (Pt is fasting ).    HPI  Pleasant established 44 y/o female with PMH DVT, Hypothyroid, PCOS, Morbid obesity, Iron anemia, Vitamin D def, Infertility associated with anovulation.  Currently Follows with Reproductive Endocrinology,  IVF.   Started metformin for PCOS  It was recommended she lose weight to better optimize her health and possibility of conception as well as healthy pregnancy. Does not appear motivated. Reports if she doesn't feel like doing something \"she just doesn't\". Does not cook, skips meals and then overeats. Most days are take out or fast food. Spouse also does not cook, works 12 hour nights and relies on pt to provide healthy meals. Referred to Nutrition in the past but did not follow up, agrees to referral today. Discussed healthy lifestyle, choices, increasing water and moving more. Making small changes and adding others over time       Anxiety, depression- Mom passed away last May soon after dementia diagnosis. Has been struggling with the loss. Spouse reports pet cat recently with cancer had to be put down. She has been seeing her dad, got photos of mom as a young girl. Follows with SW most weeks and feels visits are helpful    Right foot tingling-noted when she wears shoes, chronic RLE swelling at baseline    Hematology for Hx of PE taking Lovenox      Works remotely, Coding for   No kids     Diet mostly take out, not motivated to cook  Caffeine drinks diet soda  Water 32 oz  Exercise no  Non-smoker  Alcohol No      Eye exam >5   Dental exam 2023 q 6 months  Mammogram no, refer    Review of Systems  Review of Systems   Constitutional: Negative.    HENT: Negative.     Respiratory: Negative.     Cardiovascular: Negative.    Gastrointestinal: Negative.    Genitourinary: Negative.    Musculoskeletal: Negative.    Psychiatric/Behavioral: Negative.     All other systems reviewed and are " "negative.    .vsVisit Vitals  /76 (BP Location: Right arm, Patient Position: Sitting)   Pulse 108   Temp 36.3 °C (97.3 °F)   Ht 1.753 m (5' 9\")   Wt 143 kg (315 lb 6.4 oz)   LMP 03/02/2024   SpO2 97%   BMI 46.58 kg/m²   OB Status Having periods   Smoking Status Never   BSA 2.64 m²         Objective   Physical Exam  Vitals reviewed.   Constitutional:       Appearance: She is obese.   HENT:      Head: Normocephalic and atraumatic.      Right Ear: Tympanic membrane and ear canal normal.      Left Ear: Tympanic membrane and ear canal normal.      Nose: Nose normal.      Mouth/Throat:      Pharynx: Oropharynx is clear.   Eyes:      Extraocular Movements: Extraocular movements intact.      Conjunctiva/sclera: Conjunctivae normal.      Pupils: Pupils are equal, round, and reactive to light.   Cardiovascular:      Rate and Rhythm: Normal rate and regular rhythm.      Pulses: Normal pulses.      Heart sounds: Normal heart sounds.   Pulmonary:      Effort: Pulmonary effort is normal.      Breath sounds: Normal breath sounds. No wheezing.   Abdominal:      General: Bowel sounds are normal. There is no distension.      Palpations: Abdomen is soft.      Tenderness: There is no abdominal tenderness.   Musculoskeletal:         General: Normal range of motion.      Cervical back: Normal range of motion and neck supple.   Skin:     General: Skin is warm.      Capillary Refill: Capillary refill takes 2 to 3 seconds.   Neurological:      General: No focal deficit present.      Mental Status: She is alert.   Psychiatric:         Mood and Affect: Mood normal.         Assessment/Plan   Problem List Items Addressed This Visit       Adult hypothyroidism    Relevant Orders    TSH with reflex to Free T4 if abnormal    Morbid obesity (Multi)    Relevant Orders    Referral to Nutrition Services    PCOS (polycystic ovarian syndrome)     Metformin 750 mg bid         Relevant Orders    CBC    Comprehensive Metabolic Panel    Hemoglobin A1C "    Vitamin D deficiency    Relevant Orders    Vitamin D 25-Hydroxy,Total (for eval of Vitamin D levels)    Anemia    Relevant Orders    CBC    Iron deficiency anemia, unspecified    Relevant Orders    CBC    ELLA (generalized anxiety disorder)    Severe episode of recurrent major depressive disorder, without psychotic features (Multi)    Breast cancer screening by mammogram    Relevant Orders    BI mammo bilateral screening tomosynthesis     Other Visit Diagnoses       Annual physical exam    -  Primary    Relevant Orders    CBC    Comprehensive Metabolic Panel    Vitamin D 25-Hydroxy,Total (for eval of Vitamin D levels)    TSH with reflex to Free T4 if abnormal    Hemoglobin A1C    Lipid Panel    Vitamin B12    BI mammo bilateral screening tomosynthesis

## 2024-04-17 NOTE — PATIENT INSTRUCTIONS
Write down what you eat and drink for 2 weeks and bring to your appointment with Nutrition    Health Maintenance  Continue to follow a healthy diet with fruits and vegetables at most meals.  Daily exercise is recommended as well as adding weights 3 times a week to maintain muscle mass and for bone health.  It is recommended you drink 6 to 8 glasses of water daily, more when you are exerting yourself or in hot weather.  Make sure you follow the health screening guidelines and keep up to date on your immunizations!

## 2024-04-18 LAB
25(OH)D3 SERPL-MCNC: 31 NG/ML (ref 30–100)
TSH SERPL-ACNC: 1.54 MIU/L (ref 0.44–3.98)
VIT B12 SERPL-MCNC: 490 PG/ML (ref 211–911)

## 2024-04-19 ENCOUNTER — PHARMACY VISIT (OUTPATIENT)
Dept: PHARMACY | Facility: CLINIC | Age: 44
End: 2024-04-19
Payer: COMMERCIAL

## 2024-05-02 ENCOUNTER — SOCIAL WORK (OUTPATIENT)
Dept: BEHAVIORAL HEALTH | Facility: CLINIC | Age: 44
End: 2024-05-02
Payer: COMMERCIAL

## 2024-05-02 DIAGNOSIS — F41.1 GAD (GENERALIZED ANXIETY DISORDER): ICD-10-CM

## 2024-05-02 DIAGNOSIS — F33.2 SEVERE EPISODE OF RECURRENT MAJOR DEPRESSIVE DISORDER, WITHOUT PSYCHOTIC FEATURES (MULTI): Primary | ICD-10-CM

## 2024-05-02 PROCEDURE — 90837 PSYTX W PT 60 MINUTES: CPT

## 2024-05-02 NOTE — PROGRESS NOTES
Solution-Focused Therapy Follow up Note  Session Time: 9:00 AM -10:01 AM       Patient  Radha Gomez is a 43 y.o. female, presenting for a follow-up visit.     An interactive audio and video telecommunication system which permits real time communications between the patient (at the originating site) and provider (at the distant site) was utilized to provide this telehealth service.     Verbal consent was requested and obtained from Radha Gomez on this date, 05/02/24 for a telehealth visit.     Session conducted virtually via HIPAA compliant video.  Patient was provided with informed consent.    Chief Complaint   Patient presents with    MDD (Major Depressive Disorder)    Anxiety     Session #9    Updates/Session Content  Pt update:  Saw PCP.  Need to get health in order--eat better, exercise, particularly with going to [place] in about a month. Pt feels she's lacking motivation.    Feels doing pretty well mentally.    Yesterday, wasn't as weepy and sad as thought would be.  Actually went to animal shelter after work and looked at cats.  Couldn't decide, so holding off decision, will visit another shelter.  Went to dinner as well, but was ok.  Small ritual for Beltane.  Been more intentional about tarot cards--almost every day in April.    Been thinking about fertility; need to start taking action.  Falls in line with health goals.  Pt with goals for better eating, more exercise.  Physical thoughts/goals:  interests--ballet, yoga, kickboxing; graceful, flexible, strong.    Discussion and homework:  Behavioral activation and opposite action, motivation vs. Momentum, and the need to force yourself to get started with things sometimes.  Helped pt come up with a plan for health--discussed starting one goal at a time to prevent overwhelm.  Pt would like to start with exercise, helped pt come up with a plan to get more physically active.  Start at 7am, yoga, tarot, shower.  Force self to do 10 min yoga routine.   Will debrief about how plan is working at next session, remember ability to tweak plan if not working.    Therapy components:  identifying key behaviors and activity scheduling  Monitored dysfunctional automatic thoughts by examining the evidence and identifiying cognitive errors.  Used problem-solving strategy.    Interventions Provided: Problem Solving Treatment, Behavioral Activation, Solution Focused Therapy, Strengths Exploration, Values Exploration, and Develop Coping Strategies  Assignment(s) Given  Pt agreed to complete the following assignments: Start plan, track progress, with a goal of increasing physical activity.    Response to Intervention: Open, Engaged, and Receptive    Mental Status  General Appearance & Grooming: Appropriate  Behavior:  Appropriate  Mood: Normal  Sensorium/Cognition: No impairment and Alert & Oriented x 3    Additional Session Information  NA    Progress Made: Gaining Insight    Follow Up / Next Appointment:  5/17/24 @ 1pm

## 2024-05-13 PROCEDURE — RXMED WILLOW AMBULATORY MEDICATION CHARGE

## 2024-05-16 ENCOUNTER — PHARMACY VISIT (OUTPATIENT)
Dept: PHARMACY | Facility: CLINIC | Age: 44
End: 2024-05-16
Payer: COMMERCIAL

## 2024-05-17 ENCOUNTER — APPOINTMENT (OUTPATIENT)
Dept: BEHAVIORAL HEALTH | Facility: CLINIC | Age: 44
End: 2024-05-17
Payer: COMMERCIAL

## 2024-06-03 ENCOUNTER — HOSPITAL ENCOUNTER (OUTPATIENT)
Dept: RADIOLOGY | Facility: CLINIC | Age: 44
Discharge: HOME | End: 2024-06-03
Payer: COMMERCIAL

## 2024-06-03 DIAGNOSIS — Z00.00 ANNUAL PHYSICAL EXAM: ICD-10-CM

## 2024-06-03 DIAGNOSIS — Z12.31 BREAST CANCER SCREENING BY MAMMOGRAM: ICD-10-CM

## 2024-06-03 PROCEDURE — 77067 SCR MAMMO BI INCL CAD: CPT | Performed by: RADIOLOGY

## 2024-06-03 PROCEDURE — 77063 BREAST TOMOSYNTHESIS BI: CPT | Performed by: RADIOLOGY

## 2024-06-03 PROCEDURE — 77067 SCR MAMMO BI INCL CAD: CPT

## 2024-06-05 ENCOUNTER — SOCIAL WORK (OUTPATIENT)
Dept: BEHAVIORAL HEALTH | Facility: CLINIC | Age: 44
End: 2024-06-05
Payer: COMMERCIAL

## 2024-06-05 ENCOUNTER — HOSPITAL ENCOUNTER (OUTPATIENT)
Dept: RADIOLOGY | Facility: EXTERNAL LOCATION | Age: 44
Discharge: HOME | End: 2024-06-05

## 2024-06-05 DIAGNOSIS — F41.1 GAD (GENERALIZED ANXIETY DISORDER): ICD-10-CM

## 2024-06-05 DIAGNOSIS — F33.2 SEVERE EPISODE OF RECURRENT MAJOR DEPRESSIVE DISORDER, WITHOUT PSYCHOTIC FEATURES (MULTI): Primary | ICD-10-CM

## 2024-06-05 PROCEDURE — 90837 PSYTX W PT 60 MINUTES: CPT

## 2024-06-05 NOTE — PROGRESS NOTES
"Solution-Focused Therapy Follow up Note  Session Time: 8:02 AM -9:05 AM       Patient  Radha Gomez is a 43 y.o. female, presenting for a follow-up visit.     An interactive audio and video telecommunication system which permits real time communications between the patient (at the originating site) and provider (at the distant site) was utilized to provide this telehealth service.     Verbal consent was requested and obtained from Radha Gomez on this date, 06/05/24 for a telehealth visit.     Session conducted virtually via HIPAA compliant video.  Patient was provided with informed consent.    Chief Complaint   Patient presents with    MDD (Major Depressive Disorder)    Anxiety     Session #10    Updates/Session Content  Pt update:  Today marked by a feeling of having a lot to do and not wanting to do any of it! But...Got a new kitten!  Was a great decision so far.    Was doing pretty well with getting up early, still working on moving early.  Pt was ill for a while.  Has mostly been doing tarot readings, but fallen off for past few days.  Has a new deck, so is excited to get back into it.    In the [facility] trip planning, Leaving for trip tomorrow.  Getting travel insurance today.    Pt thinking about her role in the relationships/friendships she has cultivated.  Recognizing that she plays more of a guidance/\"therapist\" role for people, but is missing having someone take that role for her in her own life--mother has passed, and ghosted by her \"mentor\".  Is difficult--not sure where to find a \"mentor\"; has tried different social things, nothing worked so far.    Additionally, two friends just announced their pregnancies.  Sore spot for pt.    Discussion and homework:  Gave pt space to discuss feelings about current relationships; discussed what kind of relationships pt wants.  Discussed pt's desire to pick back up plans to have a child after trip. Discussed trip plans.    Therapy " components:  identifying key behaviors and identifying pleasant or meaningful activities  Monitored dysfunctional automatic thoughts by examining the evidence.  Used problem-solving strategy.    Interventions Provided: Solution Focused Therapy, Values Exploration, and Develop Coping Strategies  Assignment(s) Given  Pt agreed to complete the following assignments: Have a good trip!     Response to Intervention: Open, Engaged, and Receptive    Mental Status  General Appearance & Grooming: Appropriate  Behavior:  Appropriate  Mood: Normal  Sensorium/Cognition: No impairment and Alert & Oriented x 3    Additional Session Information  NA    Progress Made: Gaining Insight    Follow Up / Next Appointment:  7/3/2024

## 2024-07-03 ENCOUNTER — APPOINTMENT (OUTPATIENT)
Dept: BEHAVIORAL HEALTH | Facility: CLINIC | Age: 44
End: 2024-07-03
Payer: COMMERCIAL

## 2024-07-03 DIAGNOSIS — F41.1 GAD (GENERALIZED ANXIETY DISORDER): ICD-10-CM

## 2024-07-03 DIAGNOSIS — F33.2 SEVERE EPISODE OF RECURRENT MAJOR DEPRESSIVE DISORDER, WITHOUT PSYCHOTIC FEATURES (MULTI): Primary | ICD-10-CM

## 2024-07-03 PROCEDURE — 90837 PSYTX W PT 60 MINUTES: CPT

## 2024-07-03 NOTE — PROGRESS NOTES
Solution-Focused Therapy Follow up Note  Session Time: 8:02 AM -9:05 AM       Patient  Radha Gomez is a 43 y.o. female, presenting for a follow-up visit.     An interactive audio and video telecommunication system which permits real time communications between the patient (at the originating site) and provider (at the distant site) was utilized to provide this telehealth service.     Verbal consent was requested and obtained from Radha Gomez on this date, 07/03/24 for a telehealth visit.     Session conducted virtually via HIPAA compliant video.  Patient was provided with informed consent.    Chief Complaint   Patient presents with    MDD (Major Depressive Disorder)    Anxiety     Session #11    Updates/Session Content  Pt update:  [place] did not end up being as good a trip as she had hoped--one issue after another.  On conclusion of trip, suddenly had bad discussion with , who expressed that he feels she is too cavalier about her health issues, he has had a hard time supporting her as it does not seem like she is taking things seriously, so he is going to back off right now to focus on caring for himself.  Was out of the blue, but pt feels he is right.  Pt has been feeling better emotionally, and knows that she does tend to neglect her health when she feels good.  That said, she has made changes, and pt thought that it was good that she was feeling emotionally better, that he would just see some of the changes she has made, and they could laugh together about things.  Now she feels that if he doesn't see her tears and frustration, he feels she doesn't care.    She is thinking that she can only make changes when she is miserable, so that is where she must be emotionally to improve her health.    Plan is to get up and exercise in morning as long as insomnia has not been bad, eat better, go for walks at night, take better care of self.    Discussion and homework:  Discussed communication with her  , and her lack of talking with him about the work she has been doing in therapy to improve herself.  Discussed emotion, pt giving herself permission to feel and express whatever she is feeling.  Discussed importance of communication.  Discussed changes pt wants to make, pitfalls of trying to make really big changes suddenly, importance of incremental changes, importance of understanding if changes are for self or someone else.    Therapy components:  identifying key behaviors and activity scheduling  Identified automatic thoughts by noting and discussing in-session mood shift.  Monitored dysfunctional automatic thoughts by examining the evidence, identifiying cognitive errors, and weighing pros and cons or advantages and disadvantages.  Used problem-solving strategy.    Interventions Provided: Problem Solving Treatment, Motivational Interviewing, Solution Focused Therapy, Values Exploration, Communication Training, and Develop Coping Strategies  Assignment(s) Given  Pt agreed to complete the following assignments: Pt to try eating at home more regularly, get back into morning routine to improve health.    Response to Intervention: Open, Engaged, and Receptive    Mental Status  General Appearance & Grooming: Appropriate  Behavior:  Appropriate  Mood: Normal, Sad, and Tearful  Sensorium/Cognition: No impairment and Alert & Oriented x 3    Additional Session Information  NA    Progress Made: Gaining Insight    Follow Up / Next Appointment:  7/19/24

## 2024-07-19 NOTE — PSYCHOTHERAPY
Updates/Session Content  Pt update:  Deb sucked--menstruation.  Suddenly had bad discussion with /James.  Feels she is too cavalier about health issues.  Feels hs is right.  Thought that we could laught about things.  If he doesn't see the rears and frustration, he feels she doesn't care.    Plan is to get up and exercise in morning as long as insomnia has not been bad., eat better, go for walks at night, take better care of self.    Discussion and homework:  Discussed emotion, pt giving herself permission to feel and express whatever she is feeling.  Discussed importance of communication.  Discussed changes pt wants to make, pitfalls of trying to make really big changes suddenly, importance of incremental changes, importance of understanding if changes are for self or someone else.

## 2024-07-22 ENCOUNTER — APPOINTMENT (OUTPATIENT)
Dept: BEHAVIORAL HEALTH | Facility: CLINIC | Age: 44
End: 2024-07-22
Payer: COMMERCIAL

## 2024-07-22 DIAGNOSIS — F41.1 GAD (GENERALIZED ANXIETY DISORDER): ICD-10-CM

## 2024-07-22 DIAGNOSIS — F33.2 SEVERE EPISODE OF RECURRENT MAJOR DEPRESSIVE DISORDER, WITHOUT PSYCHOTIC FEATURES (MULTI): Primary | ICD-10-CM

## 2024-07-22 PROCEDURE — 90837 PSYTX W PT 60 MINUTES: CPT

## 2024-07-22 NOTE — PSYCHOTHERAPY
"Updates/Session Content  Pt update:  Been feeling very \"blah\", lately.   Trying to push self to do her health stuff, but hard to do.  Feels like she needs to redo things that have already been done.  Daily life is simply what I am getting done.  Does it even matter?    What am I actually getting done?  Been good about food.  Breakfast is shaky--morning routine shaky.  Better about lunch and dinner.  Limiting fast food.  Been walking at least 3 days a week, looking to up the time frame.  Thinks about things after the fact, and berates self for what didn't do.  In the moment, can give self a break.    Not doing anything spiritually, except Tarot in the morning.  Feels bad about that.  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    Planted routines in the spring, planted some healing that she has done.  Focus more on work and CDI--didn't get that done.  Planted house to get things more smoothly done.    Cna see effects of eating better--more money in bank account, walking farther.      Discussion and homework:  Importance of self-messaging.   Discussed pt feeling like she has made no progress and is talking about the same things now that she was when therapy started    Assignment(s) Given  Pt agreed to complete the following assignments: Create 5 positive facts about accomplishments, divide room into mini projects, complete one at a time.  "

## 2024-07-22 NOTE — PROGRESS NOTES
"Solution-Focused Therapy Follow up Note  Session Time: 2:38 PM-3:25 PM      Patient  Radha Gomez is a 43 y.o. female, presenting for a follow-up visit.     An interactive audio and video telecommunication system which permits real time communications between the patient (at the originating site) and provider (at the distant site) was utilized to provide this telehealth service.     Verbal consent was requested and obtained from Radha Gomez on this date, 07/22/24 for a telehealth visit.     Session conducted virtually via HIPAA compliant video.  Patient was provided with informed consent.    Chief Complaint   Patient presents with    MDD (Major Depressive Disorder)    Anxiety     Session #12    Updates/Session Content  Pt update:  Been feeling very \"blah\", lately.   Trying to push self to do her \"health stuff\", but hard to do.  Not sure that she is really accomplishing anything.  \"Daily life is simply what I can get done every day\".  Doesn't feel like enough.  Does it even matter?    Has actually been good about food, particularly lunch and dinner.  Limiting fast food.  Breakfast is shaky bc morning routine is shaky again.  Been walking at least 3 days a week, is getting easier to walk, looking to walk for longer.     But sometimes doesn't do much at all, and in the moment, can give self a break.   But then thinks about things after the fact, and berates self for what hasn't been done.    Not doing anything spiritually, except Tarot in the morning.  Feels bad about that.  Supposed to be going into harvest season, and doesn't feel she has anything to harvest.  Room is a mess again, doesn't feel she has a good base for her altar.    Can actually see effects of eating better--more money in bank account, walking farther, physically feeling better.    Discussion and homework:  Discussed pt feeling like she has made no progress and is talking about the same things now that she was when therapy started.  Discussed " progress pt has made based on planting pt actually made in the spring.  Realized that she does have things to harvest, that she has made progress.  Discussed importance of self-messaging, danger of negative self-messaging; importance of remembering the facts.  Pitfalls of all-or-nothing thinking, and of basing own accomplishments solely on the opinions of others.    Therapy components:  identifying key behaviors and activity monitoring  Monitored dysfunctional automatic thoughts by examining the evidence, identifiying cognitive errors, and weighing pros and cons or advantages and disadvantages.  Used problem-solving strategy.    Interventions Provided: Problem Solving Treatment, Motivational Interviewing, Solution Focused Therapy, Strengths Exploration, Values Exploration, and Develop Coping Strategies  Assignment(s) Given  Pt agreed to complete the following assignments: Create 5 positive facts about achievements; make project of getting room back in order by dividing room into mini projects, complete one at a time, give self credit.    Response to Intervention: Open, Engaged, and Receptive    Mental Status  General Appearance & Grooming: Appropriate  Behavior:  Appropriate  Mood: Normal  Sensorium/Cognition: No impairment and Alert & Oriented x 3    Additional Session Information  NA    Progress Made: Gaining Insight    Follow Up / Next Appointment:  8/30/2024

## 2024-08-12 PROCEDURE — RXMED WILLOW AMBULATORY MEDICATION CHARGE

## 2024-08-15 ENCOUNTER — PHARMACY VISIT (OUTPATIENT)
Dept: PHARMACY | Facility: CLINIC | Age: 44
End: 2024-08-15
Payer: COMMERCIAL

## 2024-08-30 ENCOUNTER — APPOINTMENT (OUTPATIENT)
Dept: BEHAVIORAL HEALTH | Facility: CLINIC | Age: 44
End: 2024-08-30
Payer: COMMERCIAL

## 2024-08-30 DIAGNOSIS — F41.1 GAD (GENERALIZED ANXIETY DISORDER): ICD-10-CM

## 2024-08-30 DIAGNOSIS — F33.2 SEVERE EPISODE OF RECURRENT MAJOR DEPRESSIVE DISORDER, WITHOUT PSYCHOTIC FEATURES (MULTI): Primary | ICD-10-CM

## 2024-08-30 PROCEDURE — 90837 PSYTX W PT 60 MINUTES: CPT

## 2024-08-30 NOTE — PROGRESS NOTES
"Solution-Focused Therapy Follow up Note  Session Time: 1:02 PM-2:05 PM      Patient  Radha Gomez is a 43 y.o. female, presenting for a follow-up visit.     An interactive audio and video telecommunication system which permits real time communications between the patient (at the originating site) and provider (at the distant site) was utilized to provide this telehealth service.     Verbal consent was requested and obtained from Radha Gomez on this date, 08/30/24 for a telehealth visit.     Session conducted virtually via HIPAA compliant video.  Patient was provided with informed consent.    Chief Complaint   Patient presents with    MDD (Major Depressive Disorder)    Anxiety     Session #13    Updates/Session Content  Pt update:  Pt doing alright today.  Was some \"funkiness\" at the beginning of the month, got worse last week.  Made mistake, said something \"offhand\" to James.  He took it in a way she did not mean.  Was not good.  Pt knows she \"can dish it out but not take it.\"  Then, cat went missing for a day.    Things a little better now--been working on letting feelings happen, as opposed to trying to bottle them up.  Things still tenuous between her and James.  Trying to be comfy with where they are, but knows that she really needs to talk to him about how he is doing and what he wants from their relationship.     Still doing well with food choices, even doing [employer] now.  Still having sugar cravings, but not giving in as frequently as used to.    Tried to use positive facts, but developed subjective statements as opposed to facts, so not effective.  Will try again.      Hasn't gotten as far cleaning room as wants to, but is keeping up on laundry--always washed, folded, put away.  Making blankets for two people having babies--is getting rid of materials from room, so that is good.  Has been thinking that she needs to make space in her room inviting before using altar.  Has plans for new table, " "new \"nest\", has thought about trajectory for cleaning/organizing room: nightstand, has cleared of a little and needs to finish, then work over to clearing out corner, then TV stand--likely area for new altar.  Could also finish blankets to clear space.      Has a big challenge for self--300,000 steps through month of September for fundraising for suicide prevention.    Discussion and homework:  Discussed importance/benefit of allowing emotions, importance of direct and honest communication.  Celebrated pt's continued success with food choices, re-discussed using positive facts to counter her negative thinking, clarified what a positive fact is.    Discussed pt using this time that she is somewhat distant from James to keep working on improvements for herself. Discussed a plan for pt to put her altar together before next two holidays.    Therapy components:  identifying key behaviors, identifying pleasant or meaningful activities, and behavioral activation  Monitored dysfunctional automatic thoughts by examining the evidence and identifiying cognitive errors.  Used problem-solving strategy.    Interventions Provided: Problem Solving Treatment, Behavioral Activation, Solution Focused Therapy, Strengths Exploration, Values Exploration, and Develop Coping Strategies  Assignment(s) Given  Pt agreed to complete the following assignments: Try last assignment with creating 5 positive facts again;  Work on room one project at a time, starting with nightstand, and moving to corner of room--take a week to work on each, and then move on., with a goal of setting stage for altar.    Response to Intervention: Open, Engaged, and Receptive    Mental Status  General Appearance & Grooming: Appropriate  Behavior:  Appropriate  Mood: Normal  Sensorium/Cognition: No impairment and Alert & Oriented x 3    Additional Session Information  NA    Progress Made: Gaining Insight    Follow Up / Next Appointment:  In process of scheduling  "

## 2024-08-30 NOTE — PSYCHOTHERAPY
"Updates/Session Content  Pt update:  Pt doing alright today.  Was some \"funkiness\" at the beginning of the month, got worse last week.  Made mistake, said something offhand to James.  He took it in a way she did not mean.  Was not good.  Pt knows she \"can dish it out but not take it.\"  Then, cat went missing for a day--was tough.    Been working on letting feelings happen, as opposed to trying to bottle them up.  Things still tenuous between her and James.  Trying to be comfy with where they are.    Still doing well with food choices, even doing [employer] now, still making better food choices overall.  Still having sugar cravings, but not giving in as frequently as used to.    Tried to use positive facts, but came up with subjective statements as opposed to facts.  Will try again.      Hasn't gotten as far in room as wants to, but is keeping up on laundry--always washed, folded, put away.  Making blankets for two people having babies--is getting rid of materials from room, so that is good.    Was thinking last week--\"want to go big\".  New table--buildup a new nest for altar, make inviting    Hsa made progress, clearing off nightstand, need to finish, then work over to clearing out corner.  Need to finish blankets to clear space.  Also dos not want to stand anymore to do altar.  Each station for a week.    Has a big challenge for self--300,000 steps through month of September for fundraising for suicide prevention.      Assignment(s) Given  Pt agreed to complete the following assignments: to miniprojecst--nightstand and corner--take a week each, and move on.,   "

## 2024-09-16 ENCOUNTER — APPOINTMENT (OUTPATIENT)
Dept: BEHAVIORAL HEALTH | Facility: CLINIC | Age: 44
End: 2024-09-16
Payer: COMMERCIAL

## 2024-09-16 DIAGNOSIS — F41.1 GAD (GENERALIZED ANXIETY DISORDER): ICD-10-CM

## 2024-09-16 DIAGNOSIS — F33.2 SEVERE EPISODE OF RECURRENT MAJOR DEPRESSIVE DISORDER, WITHOUT PSYCHOTIC FEATURES (MULTI): Primary | ICD-10-CM

## 2024-09-16 PROCEDURE — 90837 PSYTX W PT 60 MINUTES: CPT

## 2024-09-16 NOTE — PROGRESS NOTES
"Solution-Focused Therapy Follow up Note  Session Time: 12:33 PM-1:35 PM      Patient  Radha Gomez is a 43 y.o. female, presenting for a follow-up visit.     An interactive audio and video telecommunication system which permits real time communications between the patient (at the originating site) and provider (at the distant site) was utilized to provide this telehealth service.     Verbal consent was requested and obtained from Radha Gomez on this date, 09/16/24 for a telehealth visit.     Session conducted virtually via HIPAA compliant video.  Patient was provided with informed consent.    Chief Complaint   Patient presents with    MDD (Major Depressive Disorder)    Anxiety     Session #14    Updates/Session Content  Pt update:  Ups and downs.  Have actually been a lot of ups.  But pt has noticed that when thinking about certain things--triggers and such, then get in moods where has to actively pull self out.  Facts are helping, though, example: remembering that she went for a walk every single day in a week helped her keep going every other day.  Still doing well with food, working on crocheting--both feel good, trying to remember positive things.    Pt follows a lady on [employer] who is inspirational.  To deal with a \"slump\" she is experiencing, she is trying some things pt would like to try:  basically organizing goals into an overall life project, picking 2-4 areas of life to create goals for that will make next birthday amazing--call it Project 44 (age you will be at next birthday).  Area #1:  Hobbies: Photos, joseph.  Area #2:  Me--health-fitness, witchcraft.  Area #3:  Career.  Is actually already working on all of those areas, pt feeling good!    Also at 107,000 steps for the month so far!!    Discussion and homework:  Discussed pt's progress in all of her goal areas, wisdom of Project 44 since it seems to be working for her so well, potential pitfalls.    Discussed importance of pt finding ways to " "track her goals and stay motivated that work for her, specifically.      Discussed complexity of emotion, and that it is ok for pt to be \"in a mood\" sometimes, but to keep working on using her facts and viewing herself objectively to help her out of it.  Discussed the role of loss in our lives.    Therapy components:  identifying key behaviors, activity monitoring, and identifying pleasant or meaningful activities  Monitored dysfunctional automatic thoughts by examining the evidence, identifiying cognitive errors, and weighing pros and cons or advantages and disadvantages.  Used problem-solving strategy.    Interventions Provided: Problem Solving Treatment, Solution Focused Therapy, Strengths Exploration, Values Exploration, Grief Work, and Develop Coping Strategies  Assignment(s) Given  Pt agreed to complete the following assignments: Keep planning Project 43 and executing.      Response to Intervention: Open, Engaged, and Receptive    Mental Status  General Appearance & Grooming: Appropriate  Behavior:  Appropriate  Mood: Normal  Sensorium/Cognition: No impairment and Alert & Oriented x 3    Additional Session Information  NA    Progress Made: Gaining Insight    Follow Up / Next Appointment:  10/4/2024  "

## 2024-09-16 NOTE — PSYCHOTHERAPY
"Updates/Session Content  Pt update:  Ups and downs.  Have actually been a lot of ups.  But pt has noticed that when thinking about certain things--triggers and such, then get in moods where has to actively pull self out.  Facts are helping, though, example: remembering that she went for a walk every single day this week helped her keep going every other day.  Still doing well with food, working on crocheting--both feel good, trying to remember positive things.    Pt follows a lady on [employer] who is inspirational.  To deal with a \"slump\" she is experiencing, she is trying some things pt would like to try:  basically organizing goals into an overall life project, picking 2-4 areas of life to create goals for that will make next birthday amazing--call it Project 44.  Area #1:  Hobbies: Photos, joseph.  Area #2:  Me--health-fitness, witchcraft.  Area #3:  Career.    Fitness is a primary goal-moving and eating, focusing on making it a part of her life, which will hopefully lead to fertility success (is actually already making a difference).  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    At 107,000 steps!!  "

## 2024-10-04 ENCOUNTER — APPOINTMENT (OUTPATIENT)
Dept: BEHAVIORAL HEALTH | Facility: CLINIC | Age: 44
End: 2024-10-04
Payer: COMMERCIAL

## 2024-10-04 DIAGNOSIS — F33.2 SEVERE EPISODE OF RECURRENT MAJOR DEPRESSIVE DISORDER, WITHOUT PSYCHOTIC FEATURES (MULTI): Primary | ICD-10-CM

## 2024-10-04 DIAGNOSIS — F41.1 GAD (GENERALIZED ANXIETY DISORDER): ICD-10-CM

## 2024-10-04 PROCEDURE — 90837 PSYTX W PT 60 MINUTES: CPT

## 2024-10-04 NOTE — PROGRESS NOTES
"Solution-Focused Therapy Follow up Note  Session Time: 11:02 AM -12:02 PM      Patient  Radha Gomez is a 43 y.o. female, presenting for a follow-up visit.     An interactive audio and video telecommunication system which permits real time communications between the patient (at the originating site) and provider (at the distant site) was utilized to provide this telehealth service.     Verbal consent was requested and obtained from Radha Gomez on this date, 10/04/24 for a telehealth visit.     Session conducted virtually via HIPAA compliant video.  Patient was provided with informed consent.    Chief Complaint   Patient presents with    MDD (Major Depressive Disorder)    Anxiety     Session #15    Updates/Session Content  Pt update:  September went pretty well.  Goal was 300, 000, but only got close to 200, 000.  Is happy about that, though.    Doing a photo a day.  Haven't heard back yet from the job.    Project 43--going pretty well, but has gone down a joseph rabbit hole.  Made a blanket for a friend, and then others wanted one, so pt got on a roll making blankets.  New thing: temperature blanket.  Health and Fitness going ok--is losing weight, feeling healthier, but also feeling more disgusted with how self looks--plastic surgery?  Witchcraft going ok, did the minimum--Made broom, did tarot, cleansed the house for the winter, didn't bake, but that is ok.   Mood has overall been ok--does find herself analyzing self and hating self at times.    Talked with friend about how their relationships are not what they \"should\" be.  Friend is in a worse space than pt, pt does think things are going better with her .    Dad and gf coming to help deep clean home.  Idea of dad having gf still odd to pt, but she does keep him active as she is more outgoing than he is.  Really wants to get stuff cleaned and organized in house.  Got some foundation stuff left to do, but also needs to redo a whole " bathroom.    Discussion and homework:  Discussed importance of positive self regard, importance of giving self credit for progress made.  Discussed good progress pt has made, fact that current plan seems to be working for her.    Therapy components:  identifying key behaviors and activity monitoring  Monitored dysfunctional automatic thoughts by examining the evidence and identifiying cognitive errors.  Used problem-solving strategy.    Interventions Provided: Solution Focused Therapy, Strengths Exploration, Values Exploration, and Develop Coping Strategies  Assignment(s) Given  Pt agreed to complete the following assignments: Keep following current plan.     Response to Intervention: Open, Engaged, and Receptive    Mental Status  General Appearance & Grooming: Appropriate  Behavior:  Appropriate  Mood: Normal  Sensorium/Cognition: No impairment and Alert & Oriented x 3    Additional Session Information  NA    Progress Made: Gaining Insight    Follow Up / Next Appointment:  10/18/2024

## 2024-10-18 ENCOUNTER — APPOINTMENT (OUTPATIENT)
Dept: BEHAVIORAL HEALTH | Facility: CLINIC | Age: 44
End: 2024-10-18
Payer: COMMERCIAL

## 2024-10-18 DIAGNOSIS — F41.1 GAD (GENERALIZED ANXIETY DISORDER): ICD-10-CM

## 2024-10-18 DIAGNOSIS — F33.2 SEVERE EPISODE OF RECURRENT MAJOR DEPRESSIVE DISORDER, WITHOUT PSYCHOTIC FEATURES (MULTI): Primary | ICD-10-CM

## 2024-10-18 PROCEDURE — 90837 PSYTX W PT 60 MINUTES: CPT

## 2024-10-18 NOTE — PROGRESS NOTES
"Solution-Focused Therapy Follow up Note  Session Time: 11:01 AM -11:58 AM       Patient  Radha Gomez is a 43 y.o. female, presenting for a follow-up visit.     An interactive audio and video telecommunication system which permits real time communications between the patient (at the originating site) and provider (at the distant site) was utilized to provide this telehealth service.     Verbal consent was requested and obtained from Radha Gomez on this date, 10/18/24 for a telehealth visit.     Session conducted virtually via HIPAA compliant video.  Patient was provided with informed consent.    Chief Complaint   Patient presents with    MDD (Major Depressive Disorder)    Anxiety     Session #16    Updates/Session Content  Pt update:  Doing pretty well.  Got the new position, starts Nov 4th!  Looking forward to the transition.  Everything else going pretty well, pt feels good.      Weather (rain, cold) dampened \"get out and move\" spirit, didn't walk for a couple of days, but did go out again yesterday.    Slowly working back toward intimacy with .  Think about talking to him, but avoid it for fear of conversation not being a good one.  Pt worried about regressing mentally/emotionally, feels  is not in a \"good place\".  Focusing on working on herself for now.    Discussion and homework:  Discussed pt's progress, pt proud of how well she has been doing.  Really looking forward to new job.    Discussed anxiety and cycle of anxiety re: pt's avoidance of conversation with her .  Discussed pt monitoring her avoidance and anxiety levels.    Therapy components:  identifying key behaviors and activity monitoring  Monitored dysfunctional automatic thoughts by examining the evidence, identifiying cognitive errors, and weighing pros and cons or advantages and disadvantages.  Used problem-solving strategy.    Interventions Provided: Problem Solving Treatment, Solution Focused Therapy, Strengths " Exploration, and Develop Coping Strategies  Assignment(s) Given  Pt agreed to complete the following assignments: Keep using working coping mechanisms.      Response to Intervention: Open, Engaged, and Receptive    Mental Status  General Appearance & Grooming: Appropriate  Behavior:  Appropriate  Mood: Normal  Sensorium/Cognition: No impairment and Alert & Oriented x 3    Additional Session Information  NA    Progress Made: Gaining Insight    Follow Up / Next Appointment:  11/1/2024

## 2024-10-22 ENCOUNTER — APPOINTMENT (OUTPATIENT)
Dept: PRIMARY CARE | Facility: CLINIC | Age: 44
End: 2024-10-22
Payer: COMMERCIAL

## 2024-10-22 VITALS
HEART RATE: 88 BPM | SYSTOLIC BLOOD PRESSURE: 124 MMHG | BODY MASS INDEX: 43.56 KG/M2 | WEIGHT: 293 LBS | OXYGEN SATURATION: 99 % | DIASTOLIC BLOOD PRESSURE: 80 MMHG | TEMPERATURE: 96.9 F

## 2024-10-22 DIAGNOSIS — Z23 ENCOUNTER FOR IMMUNIZATION: ICD-10-CM

## 2024-10-22 DIAGNOSIS — E66.01 MORBID OBESITY (MULTI): ICD-10-CM

## 2024-10-22 DIAGNOSIS — F41.8 DEPRESSION WITH ANXIETY: Primary | ICD-10-CM

## 2024-10-22 PROCEDURE — 90656 IIV3 VACC NO PRSV 0.5 ML IM: CPT | Performed by: NURSE PRACTITIONER

## 2024-10-22 PROCEDURE — 99213 OFFICE O/P EST LOW 20 MIN: CPT | Performed by: NURSE PRACTITIONER

## 2024-10-22 PROCEDURE — 90471 IMMUNIZATION ADMIN: CPT | Performed by: NURSE PRACTITIONER

## 2024-10-22 ASSESSMENT — PAIN SCALES - GENERAL: PAINLEVEL_OUTOF10: 0-NO PAIN

## 2024-10-22 ASSESSMENT — PATIENT HEALTH QUESTIONNAIRE - PHQ9
SUM OF ALL RESPONSES TO PHQ9 QUESTIONS 1 AND 2: 0
2. FEELING DOWN, DEPRESSED OR HOPELESS: NOT AT ALL
1. LITTLE INTEREST OR PLEASURE IN DOING THINGS: NOT AT ALL

## 2024-10-22 ASSESSMENT — ENCOUNTER SYMPTOMS: DEPRESSION: 0

## 2024-10-22 NOTE — PROGRESS NOTES
Subjective   Patient ID: Radha Gomez is a 43 y.o. female who presents for Follow-up.    HPI   Pleasant established 44 y/o female with PMH DVT, Hypothyroid, PCOS, Morbid obesity, Iron anemia, Vitamin D def, Infertility associated with anovulation.    Follows with Reproductive Endocrinology,  IVF, taking a break for the time being     Started metformin for PCOS, no concerns  Continues Lovenox, no bleeding concerns    Weight Loss- It was recommended she lose weight to better optimize her health and possibility of conception as well as healthy pregnancy. Doing well, started making small changes, eating healthier and moving more. Weight down 15# which is encouraging. She is feeling better, still struggles with motivation at times but overall in a better place. Enjoys walking, discussed adding resistance to her regimen.     Anxiety, depression- follows with counselor regularly, setting and achieving her goals. Excited to be starting a new job next month,  more on site work    Review of Systems  Review of Systems   Constitutional: Negative.    HENT: Negative.     Respiratory: Negative.     Cardiovascular: Negative.    Gastrointestinal: Negative.    Genitourinary: Negative.    Musculoskeletal: Negative.    Psychiatric/Behavioral: Negative.     All other systems reviewed and are negative.    Objective   /80 (BP Location: Right arm, Patient Position: Sitting)   Pulse 88   Temp 36.1 °C (96.9 °F)   Wt 134 kg (295 lb)   LMP 10/14/2024   SpO2 99%   BMI 43.56 kg/m²     Physical Exam  Physical Exam  Vitals reviewed.   Constitutional:       General: She is active.   HENT:      Head: Normocephalic and atraumatic.   Cardiovascular:      Rate and Rhythm: Normal rate and regular rhythm.   Pulmonary:      Effort: Pulmonary effort is normal.      Breath sounds: Normal breath sounds.   Musculoskeletal:         General: Normal range of motion.      Cervical back: Neck supple.   Neurological:      General: No focal deficit  present.      Mental Status: She is alert.     Assessment/Plan   Problem List Items Addressed This Visit             ICD-10-CM    Depression with anxiety - Primary F41.8     Doing well  Follows with Counselor regularly         Morbid obesity (Multi) E66.01     Following healthy lifestyle, taking more walks  Weight down 15#         Encounter for immunization Z23    Relevant Orders    Flu vaccine, trivalent, preservative free, age 6 months and greater (Fluarix/Fluzone/Flulaval) (Completed)

## 2024-10-22 NOTE — PATIENT INSTRUCTIONS
Health Maintenance  Continue to follow a healthy diet with fruits and vegetables at most meals.  Daily exercise is recommended as well as adding weights 3 times a week to maintain muscle mass and for bone health.  It is recommended you drink 6 to 8 glasses of water daily, more when you are exerting yourself or in hot weather.  Make sure you follow the health screening guidelines and keep up to date on your immunizations!

## 2024-11-01 ENCOUNTER — APPOINTMENT (OUTPATIENT)
Dept: BEHAVIORAL HEALTH | Facility: CLINIC | Age: 44
End: 2024-11-01
Payer: COMMERCIAL

## 2024-11-01 DIAGNOSIS — F41.1 GAD (GENERALIZED ANXIETY DISORDER): ICD-10-CM

## 2024-11-01 DIAGNOSIS — F33.2 SEVERE EPISODE OF RECURRENT MAJOR DEPRESSIVE DISORDER, WITHOUT PSYCHOTIC FEATURES (MULTI): Primary | ICD-10-CM

## 2024-11-01 PROCEDURE — 90834 PSYTX W PT 45 MINUTES: CPT

## 2024-11-01 NOTE — PROGRESS NOTES
"Solution-Focused Therapy Follow up Note  Session Time: 11:04 AM -11:47 AM       Patient  Radha Gomez is a 43 y.o. female, presenting for a follow-up visit.     An interactive audio and video telecommunication system which permits real time communications between the patient (at the originating site) and provider (at the distant site) was utilized to provide this telehealth service.     Verbal consent was requested and obtained from Radha Gomez on this date, 11/01/24 for a telehealth visit.     Session conducted virtually via HIPAA compliant video.  Patient was provided with informed consent.    Chief Complaint   Patient presents with    MDD (Major Depressive Disorder)    Anxiety     Session #17    Updates/Session Content  Pt update:  Got to play D&D last night, was fun, but long, so tired today.    Excited to start new job on Monday!  Has some activity planned with a friend over the weekend.  Have been \"experimenting with not exercising\"--is really feeling it in a bad way, planning to get back on track.    Pt and  have been \"nicer\" to one another.  Pt glad about that, but still hasn't had a conversation.   Went to see dad and his gf, and they're ok...can tell that dad is not especially happy, but seems to be doing ok.    Doing well with witch practices--is the season to emphasize light, messages have been meaningful, right, \"aligning\".  [employer] time--50,000 word challenge to write a novel.  Goal with brother in law to finish something.    Has some down days, but finds she is getting through them faster, easier.      Discussion and homework:  Discussed the power of momentum, but first, have to get started. Discussed pt's progress..has been doing really well, doing things she wants to do, taking the approach that when she does things that are good for her, she feels good.  If she doesn't, she won't.  Discussed the freedom that comes with accepting that our emotions can vary from moment to moment and " we can still be ok.    Therapy components:  identifying key behaviors, activity monitoring, and identifying pleasant or meaningful activities  Monitored dysfunctional automatic thoughts by examining the evidence and identifiying cognitive errors.  Used problem-solving strategy.    Interventions Provided: Solution Focused Therapy, Strengths Exploration, Values Exploration, and Develop Coping Strategies  Assignment(s) Given  Pt agreed to complete the following assignments: Keep using successful coping skills/mechanisms.    Response to Intervention: Open, Engaged, and Receptive    Mental Status  General Appearance & Grooming: Appropriate  Behavior:  Appropriate  Mood: Normal  Sensorium/Cognition: No impairment and Alert & Oriented x 3    Additional Session Information  NA    Progress Made: Gaining Insight    Follow Up / Next Appointment:  11/15/2024

## 2024-11-15 ENCOUNTER — APPOINTMENT (OUTPATIENT)
Dept: BEHAVIORAL HEALTH | Facility: CLINIC | Age: 44
End: 2024-11-15
Payer: COMMERCIAL

## 2024-11-15 DIAGNOSIS — F41.1 GAD (GENERALIZED ANXIETY DISORDER): ICD-10-CM

## 2024-11-15 DIAGNOSIS — F33.2 SEVERE EPISODE OF RECURRENT MAJOR DEPRESSIVE DISORDER, WITHOUT PSYCHOTIC FEATURES (MULTI): Primary | ICD-10-CM

## 2024-11-15 PROCEDURE — 90837 PSYTX W PT 60 MINUTES: CPT

## 2024-11-15 NOTE — PROGRESS NOTES
"Solution-Focused Therapy Follow up Note  Session Time: 11:06 AM -12:01 PM      Patient  Radha Gomez is a 43 y.o. female, presenting for a follow-up visit.     An interactive audio and video telecommunication system which permits real time communications between the patient (at the originating site) and provider (at the distant site) was utilized to provide this telehealth service.     Verbal consent was requested and obtained from Radha Gomez on this date, 11/15/24 for a telehealth visit.     Session conducted virtually via HIPAA compliant video.  Patient was provided with informed consent.    Chief Complaint   Patient presents with    MDD (Major Depressive Disorder)    Anxiety     Session #18    Updates/Session Content  Pt update:  Pt likes new job, but is a lot.  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      Pt also distressed by election results, but managing well.  Deciding to take a \"wait and see\" approach.    Heat is out in her home, is making it tough to get up in the morning.    As for her practices, self-improvement--she has been indulging a bit too much with food, but planning to get back on track.  While pt is experiencing some disappointment that she is not getting back on track, she feels she is handling that well--knows she is making choices, feels confident that when she is fully ready, she'll get back to everything.  New job is taking up a lot of mental space right now.  Also thinking to utilize [employer]  to help keep her on mental/emotional/physcial health/wellness path.  Check into some of the benefits/discounts  has for gyms.    Still crocheting.  Tends to think about work while crocheting.  Maybe needs to do audio books; crocheting is meant to be her personal time!      Discussion and homework:  Discussed pt's thoughts/feelings about election, pt doing ok.  Discussed pt's approach to her new job.  Discussed pt's progress in therapy.  Doing really " well right now.    Therapy components:  identifying key behaviors, activity monitoring, and identifying pleasant or meaningful activities  Monitored dysfunctional automatic thoughts by examining the evidence, identifiying cognitive errors, and weighing pros and cons or advantages and disadvantages.  Used problem-solving strategy.    Interventions Provided: Solution Focused Therapy, Strengths Exploration, Values Exploration, and Develop Coping Strategies    Response to Intervention: Open, Engaged, and Receptive    Mental Status  General Appearance & Grooming: Appropriate  Behavior:  Appropriate  Mood: Normal  Sensorium/Cognition: No impairment and Alert & Oriented x 3    Additional Session Information  NA    Progress Made: Gaining Insight    Follow Up / Next Appointment:  12/13/2024

## 2024-12-13 ENCOUNTER — APPOINTMENT (OUTPATIENT)
Dept: BEHAVIORAL HEALTH | Facility: CLINIC | Age: 44
End: 2024-12-13
Payer: COMMERCIAL

## 2024-12-13 DIAGNOSIS — F41.1 GAD (GENERALIZED ANXIETY DISORDER): ICD-10-CM

## 2024-12-13 DIAGNOSIS — F33.2 SEVERE EPISODE OF RECURRENT MAJOR DEPRESSIVE DISORDER, WITHOUT PSYCHOTIC FEATURES (MULTI): Primary | ICD-10-CM

## 2024-12-13 PROCEDURE — 90832 PSYTX W PT 30 MINUTES: CPT
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
## 2025-01-03 ENCOUNTER — APPOINTMENT (OUTPATIENT)
Dept: BEHAVIORAL HEALTH | Facility: CLINIC | Age: 45
End: 2025-01-03
Payer: COMMERCIAL

## 2025-01-03 DIAGNOSIS — F33.2 SEVERE EPISODE OF RECURRENT MAJOR DEPRESSIVE DISORDER, WITHOUT PSYCHOTIC FEATURES (MULTI): Primary | ICD-10-CM

## 2025-01-03 DIAGNOSIS — F41.1 GAD (GENERALIZED ANXIETY DISORDER): ICD-10-CM

## 2025-01-03 PROCEDURE — 90832 PSYTX W PT 30 MINUTES: CPT

## 2025-01-03 NOTE — PROGRESS NOTES
"Solution-Focused Therapy Follow up Note  Session Time: 11:03 AM -11:40 AM       Patient  Radha Gomez is a 44 y.o. female, presenting for a follow-up visit.     An interactive audio and video telecommunication system which permits real time communications between the patient (at the originating site) and provider (at the distant site) was utilized to provide this telehealth service.     Verbal consent was requested and obtained from Radha Gomez on this date, 01/03/25 for a telehealth visit.     Session conducted virtually via HIPAA compliant video.  Patient was provided with informed consent.    Chief Complaint   Patient presents with    MDD (Major Depressive Disorder)    Anxiety     Session #20    Updates/Session Content  Pt update:  Holidays were all right, \"talon boring\".  Went to dad's.  Had a quiet New Year's.    Going to [place] in Feb to see 's family, will do Sly with them then.  Trying to figure out gifting for somewhat antisocial sis-in-law.    Job going well, still dealing with imposter syndrome, but the day to day is going well.    Pt still working on her Self-improvement, planning for 2025.  Getting together \"witchy\" systems and plan for health, wanting to work on self-esteem more this year as introspection for the winter, hibernation period.  Been good with  for about a month, but want more openness, knows they need to talk, finding it hard to get started.  Plans to make runes out of horse hooves.    Discussion and homework:  Discussed some of the origins of pt's imposter syndrome, which she still sometimes feels with her job.  Discussed some ways for pt to work on her communication with her  to work on their closeness.  Pt feels things are largely going well.      Therapy components:  identifying key behaviors, activity monitoring, and identifying pleasant or meaningful activities  Monitored dysfunctional automatic thoughts by examining the evidence, identifiying cognitive " errors, and weighing pros and cons or advantages and disadvantages.  Used problem-solving strategy.    Interventions Provided: Problem Solving Treatment, Motivational Interviewing, Solution Focused Therapy, Strengths Exploration, Values Exploration, Communication Training, and Develop Coping Strategies  Assignment(s) Given  Pt agreed to complete the following assignments: work on how to begin conversations with .      Response to Intervention: Open, Engaged, and Receptive    Mental Status  General Appearance & Grooming: Appropriate  Behavior:  Appropriate  Mood: Normal  Sensorium/Cognition: No impairment and Alert & Oriented x 3    Additional Session Information  NA    Progress Made: Gaining Insight    Follow Up / Next Appointment:  1/24/2025

## 2025-01-24 ENCOUNTER — APPOINTMENT (OUTPATIENT)
Dept: BEHAVIORAL HEALTH | Facility: CLINIC | Age: 45
End: 2025-01-24
Payer: COMMERCIAL

## 2025-01-24 DIAGNOSIS — F41.1 GAD (GENERALIZED ANXIETY DISORDER): ICD-10-CM

## 2025-01-24 DIAGNOSIS — F33.2 SEVERE EPISODE OF RECURRENT MAJOR DEPRESSIVE DISORDER, WITHOUT PSYCHOTIC FEATURES (MULTI): Primary | ICD-10-CM

## 2025-01-24 PROCEDURE — 90837 PSYTX W PT 60 MINUTES: CPT

## 2025-01-24 NOTE — PROGRESS NOTES
Solution-Focused Therapy Follow up Note  Session Time: 11:02 AM -11:58 AM       Patient  Radha Gomez is a 44 y.o. female, presenting for a follow-up visit.     An interactive audio and video telecommunication system which permits real time communications between the patient (at the originating site) and provider (at the distant site) was utilized to provide this telehealth service.     Verbal consent was requested and obtained from Radha Gomez on this date, 01/24/25 for a telehealth visit.     Session conducted virtually via HIPAA compliant video.  Patient was provided with informed consent.    Chief Complaint   Patient presents with    MDD (Major Depressive Disorder)    Anxiety     Session #21    Updates/Session Content  Pt update:  Feel like in a rut again.  Handling it better, doesn't feel like world is ending, but would like to handle even better.  Week of Feb 10, will be on vacation.  Would like to get things together before then.    Everything in her life is still going, but not at optimal levels.  Need to get everything back up to acceptable levels.    One thing that pt still has not addressed is having a conversation with her ; pt unaware of how  feels about their current lack of closeness/intimacy.    Discussion and homework:  Discussed pt getting back on track one issue at a time so that she is not overwhelming herself.  Discussed the main outstanding issue--conversation with  about increasing closeness, intimacy; discussed some ways for pt to think about the conversation and approach the conversation.  Is pt maybe happy enough the way things are?  Pt pretty emotional, scared of what the outcome might be.    Therapy components:  identifying key behaviors and activity monitoring  Monitored dysfunctional automatic thoughts by examining the evidence, identifiying cognitive errors, and weighing pros and cons or advantages and disadvantages.  Used problem-solving  strategy.    Interventions Provided: Problem Solving Treatment, Interpersonal Therapy, Motivational Interviewing, Solution Focused Therapy, Strengths Exploration, Values Exploration, Communication Training, and Develop Coping Strategies  Assignment(s) Given  Pt agreed to complete the following assignments: Think about conversation with .      Response to Intervention: Open, Engaged, and Receptive    Mental Status  General Appearance & Grooming: Appropriate  Behavior:  Appropriate  Mood: Normal  Sensorium/Cognition: No impairment and Alert & Oriented x 3    Additional Session Information  NA    Progress Made: Gaining Insight    Follow Up / Next Appointment:  2/28/2025

## 2025-02-19 ENCOUNTER — APPOINTMENT (OUTPATIENT)
Dept: HEMATOLOGY/ONCOLOGY | Facility: CLINIC | Age: 45
End: 2025-02-19
Payer: COMMERCIAL

## 2025-02-19 VITALS
HEART RATE: 88 BPM | TEMPERATURE: 97.3 F | DIASTOLIC BLOOD PRESSURE: 80 MMHG | RESPIRATION RATE: 20 BRPM | SYSTOLIC BLOOD PRESSURE: 153 MMHG | OXYGEN SATURATION: 99 % | BODY MASS INDEX: 40.7 KG/M2 | WEIGHT: 275.57 LBS

## 2025-02-19 DIAGNOSIS — I26.99 PULMONARY EMBOLISM, UNSPECIFIED CHRONICITY, UNSPECIFIED PULMONARY EMBOLISM TYPE, UNSPECIFIED WHETHER ACUTE COR PULMONALE PRESENT (MULTI): Primary | ICD-10-CM

## 2025-02-19 PROCEDURE — 99214 OFFICE O/P EST MOD 30 MIN: CPT | Performed by: INTERNAL MEDICINE

## 2025-02-19 RX ORDER — ENOXAPARIN SODIUM 100 MG/ML
INJECTION SUBCUTANEOUS EVERY 12 HOURS
COMMUNITY

## 2025-02-19 ASSESSMENT — PAIN SCALES - GENERAL: PAINLEVEL_OUTOF10: 0-NO PAIN

## 2025-02-19 NOTE — PROGRESS NOTES
LOCATION:  [facility] at [facility].     HEMATOLOGY & ONCOLOGY PROBLEMS:  1. Coagulopathy.         a. History of right-leg DVT in 2007.         b. Maintained on Coumadin for 6 months.         c. History of bilateral pulmonary embolism and left-leg DVT in 2015.         d. Maintained on Xarelto, later transitioned to Lovenox due to excessive uterine bleeding.         e. Bilateral pulmonary embolism in Sept 2019 (compliance/ dosage issue with Lovenox).         f.  Restarted on anticoagulation with Lovenox.      CHIEF COMPLAINT:    The patient is in the clinic today for management of coagulopathy and history of recurrent pulmonary embolisms and DVT and continuation of anticoagulation.                HISTORY:   Ms. Gomez is a 44 year old pleasant female with coagulapathy and recurrent bilateral pulmonary embolism. She initially had an episode of right leg DVT in 2007 after prolonged drive back and forth to [place]. She was maintain on Coumadin for 6 months.  She did well for almost 8 years but had a sudden onset shortness of breath in 2015 and further evaluation confirmed bilateral pulmonary embolisms. She was maintained on Xarelto since 2015, which was changed to daily SQ lovenox 70 mg daily due to recurrent  uterine bleeding.  She underwent dilation and curettage at [facility] on 8/22/19 and did not resume her lovenox afterwards. After a week, she presented to the [place] ER with complaints of severe dyspnea on 9/15/19. Chest X-ray was negative for acute cardiopulmonary  process. CT-angiogram revealed a large PE in right pulmonary artery, bilateral lobar and segmental pulmonary emboli, possible developing right heart strain, and subtle ground-glass infiltrate in the medial left lower lobe.  Duplex US was negative for  lower extremity DVT. She has been maintained on therapeutic Lovenox since then. Family history is significant for her paternal grandmother and her brother having history of thromboembolic  complications. Hypercoagulable workup in the past has been unremarkable  except for the finding of elevated factor VIII levels.      INTERVAL HISTORY:  She denies any new complaints other than chronic issues with pain at injection sites.  As per the patient she and her  has decided not to proceed with the pregnancy route for now.  They want to concentrate on their health issues at this time.  As such she is requesting transition of anticoagulation from Lovenox to DOAC.      PAST MEDICAL HISTORY:   1. Hypothyroidism.   2. Amenorrhea.   3. Anemia.   4. Depression/Anxiety.   5. Obesity.   6. Polycystic ovarian disease (PCOS).   7. Tachycardia.   8. History of DVT and PE.   9. History of cholecystectomy.      SOCIAL HISTORY:    for 11 years and lives in [place]. Nonsmoker. Nonalcoholic. She works as a . Born in [place], [place] and raised in [place], [place], [place], and [place].      FAMILY HISTORY:    Mother is 69 y/o and father is 67 y/o, alive and well. Has 1 half-brother. Has no children. Paternal grandmother had a history of DVTs. No other specific history of bleeding, clotting or malignant disorder in the family.     REVIEW OF SYSTEMS:  Pertinent finding as per the history above. All other systems have been reviewed and generally negative and noncontributory.     ALLERGY & MEDICATIONS:  Allergies and latest outpatient medications list were reviewed in the EMR.    VITAL SIGNS  BSA: 2.47 meters squared  /80   Pulse 88   Temp 36.3 °C (97.3 °F)   Resp 20   Wt 125 kg (275 lb 9.2 oz)   SpO2 99%   BMI 40.70 kg/m²     PHYSICAL EXAMINATION:  Detailed examination not done.    LAB RESULTS:  CBC's, CMP etc. were unremarkable in August 2023.     ASSESSMENT & PLAN:  1. Coagulopathy. Please refer to the details of initial presentation and management as outlined above. In summary, she initially had an episode of right leg DVT  in 2007 after prolonged drive back and forth to [facility]  [place]. She was maintain on Coumadin for 6 months. She did well for almost 8 years but had sudden onset of anxiety attack/shortness of breath in 2015 and further evaluation confirmed bilateral pulmonary embolisms.  She was maintained on Xarelto since 2015, which was changed to SQ lovenox 70 mg daily due to recurrent uterine bleeding.  She underwent dilation and curettage at [facility] on 8/22/19 and did not resume her lovenox afterwards. After a week,  she presented to the [place] ER with complaints of severe dyspnea on 9/15/19.  CT-angiogram revealed a large PE in right pulmonary artery, bilateral lobar and segmental pulmonary emboli, possible developing right heart strain, and subtle ground-glass infiltrate  in the medial left lower lobe.  Duplex US was negative for lower extremity DVT. She was placed on therapeutic Lovenox at 135mg twice a day. Family history is significant for her paternal grandmother having history of thromboembolic complications. Hypercoagulable  workup in the past has been unremarkable except for the finding of elevated factor VIII levels.      She will continue with prolonged anticoagulation.  As stated above, she and her  have decided not to pursue the pregnancy route right now and they want to focus on their own health.  She as per her request we will transition her back to Xarelto from Lovenox.  A prescription of 20 mg once a day dosing was sent to  pharmacy in [place].  In the future if she decide to pursue the fertility treatments then we will have to we transition her to Lovenox.  Coumadin and DOACs are contraindicated during pregnancy. We will continue to monitor her closely.      2. Follow up:  Patient will return to the clinic in 6 months.  Advised to contact us immediately if there are any new questions or problems.     This note has been transcribed using Dragon voice recognition system and there is a possibility of unintentional typing misprints.

## 2025-02-20 PROCEDURE — RXMED WILLOW AMBULATORY MEDICATION CHARGE

## 2025-02-24 ENCOUNTER — PHARMACY VISIT (OUTPATIENT)
Dept: PHARMACY | Facility: CLINIC | Age: 45
End: 2025-02-24
Payer: COMMERCIAL

## 2025-02-28 ENCOUNTER — APPOINTMENT (OUTPATIENT)
Dept: BEHAVIORAL HEALTH | Facility: CLINIC | Age: 45
End: 2025-02-28
Payer: COMMERCIAL

## 2025-02-28 DIAGNOSIS — F33.2 SEVERE EPISODE OF RECURRENT MAJOR DEPRESSIVE DISORDER, WITHOUT PSYCHOTIC FEATURES (MULTI): Primary | ICD-10-CM

## 2025-02-28 DIAGNOSIS — F41.1 GAD (GENERALIZED ANXIETY DISORDER): ICD-10-CM

## 2025-02-28 PROCEDURE — 90837 PSYTX W PT 60 MINUTES: CPT

## 2025-02-28 NOTE — PROGRESS NOTES
"Solution-Focused Therapy Follow up Note  Session Time: 9:02 AM -10:01 AM       Patient  Radha Gomez is a 44 y.o. female, presenting for a follow-up visit.     An interactive audio and video telecommunication system which permits real time communications between the patient (at the originating site) and provider (at the distant site) was utilized to provide this telehealth service.     Verbal consent was requested and obtained from Radha Gomez on this date, 02/28/25 for a telehealth visit and the patient's location was confirmed at the time of the visit.    Session conducted virtually via HIPAA compliant video.  Patient was provided with informed consent.    Chief Complaint   Patient presents with    MDD (Major Depressive Disorder)    Anxiety     Session #22    Updates/Session Content  Pt update:  Things have been going pretty well.  Job is still going well, holds interest for pt, lots to do.    Feel a little \"lazy\" right now.  Is it hormonal?  Beginning of month was good, getting things done, now feels like losing steam.  Had vacation to [place] with family for Feb Christmas at [facility] of Feb, was a great trip, had a lot of fun with .    Started conversation with  about their relationship.  Agreed that they seem to be in a good space right now.  Couples counseling?  Pt made a realization that she doesn't like herself much when she is not going at her goals full force, so understands that she needs to work on herself.    Did have once incidence in [place] where she and bro in law were listening to [employer], and pt suddenly started crying as it reminded her of her mother.  Thought she was ok, but now not sure how she is doing with her grief.     Discussion and homework:  Discussed importance of moderation in thinking about ourselves and how much we accomplish--giving self permission to take breaks, not being so hard on self.  Discussed some ways to do this.  Discussed pt continuing to have conversations with " her .    Discussed grief, importance of allowing grief, importance of remembering that grief is an expression of the fact that we care.    Therapy components:  identifying key behaviors, activity monitoring, and identifying pleasant or meaningful activities  Monitored dysfunctional automatic thoughts by examining the evidence and weighing pros and cons or advantages and disadvantages.  Used problem-solving strategy.    Interventions Provided: Motivational Interviewing, Solution Focused Therapy, Strengths Exploration, Values Exploration, and Develop Coping Strategies  Assignment(s) Given  Pt agreed to complete the following assignments: Start listing self-care items along with tasks and chores to be done for the day, for more balanced days and more balanced personal perspective.      Response to Intervention: Open, Engaged, and Receptive    Mental Status  General Appearance & Grooming: Appropriate  Behavior:  Appropriate  Mood: Normal  Sensorium/Cognition: No impairment and Alert & Oriented x 3    Additional Session Information  NA    Progress Made: Gaining Insight    Follow Up / Next Appointment:  4-4-25

## 2025-04-04 ENCOUNTER — APPOINTMENT (OUTPATIENT)
Dept: BEHAVIORAL HEALTH | Facility: CLINIC | Age: 45
End: 2025-04-04
Payer: COMMERCIAL

## 2025-04-04 DIAGNOSIS — F33.2 SEVERE EPISODE OF RECURRENT MAJOR DEPRESSIVE DISORDER, WITHOUT PSYCHOTIC FEATURES (MULTI): Primary | ICD-10-CM

## 2025-04-04 DIAGNOSIS — F41.1 GAD (GENERALIZED ANXIETY DISORDER): ICD-10-CM

## 2025-04-04 PROCEDURE — 90837 PSYTX W PT 60 MINUTES: CPT

## 2025-04-04 NOTE — PROGRESS NOTES
"Solution-Focused Therapy Follow up Note  Session Time: 9:02 AM -10:07 AM       Patient  Radha Gomez is a 44 y.o. female, presenting for a follow-up visit.     An interactive audio and video telecommunication system which permits real time communications between the patient (at the originating site) and provider (at the distant site) was utilized to provide this telehealth service.     Verbal consent was requested and obtained from Radha Gomez on this date, 04/04/25 for a telehealth visit and the patient's location was confirmed at the time of the visit.    Session conducted virtually via HIPAA compliant video.  Patient was provided with informed consent.    Chief Complaint   Patient presents with    MDD (Major Depressive Disorder)    Anxiety     Session #23    Updates/Session Content  Pt update:  Still doing \"pretty good\".  Feeling the \"laziness\" thing, but feels like handling stress well.  Not sure how to \"motivate\" self.   Has been talking to  a bit more, so that is going ok, but is in a weird stage, is not where pt wants it to be.      Also struggling a bit with having \"blue\" periods of time.  Has been talking about it with --should he up his meds?  Should she start meds?     Discussion and homework:  Discussed importance of pt attempting to continue to work herself through her emotions, allowing herself to experience and process them, that it is natural to have periods of all kinds of emotions, even mixed emotions.  Discussed the danger of putting pressure on self to always be happy, or feel good.  Discussed importance of continuing to talk about pt's moods and emotions to determine if medication would be a good fit.    Pt would like to make sure to talk more about relationship at next session.  Will also figure out future scheduling.    Therapy components:  identifying key behaviors and activity monitoring  Monitored dysfunctional automatic thoughts by examining the evidence, identifiying " cognitive errors, and weighing pros and cons or advantages and disadvantages.  Used problem-solving strategy.    Interventions Provided: Solution Focused Therapy, Strengths Exploration, Values Exploration, and Develop Coping Strategies    Response to Intervention: Open, Engaged, and Receptive    Mental Status  General Appearance & Grooming: Appropriate  Behavior:  Appropriate  Mood: Normal  Sensorium/Cognition: No impairment and Alert & Oriented x 3    Additional Session Information  NA    Progress Made: Gaining Insight    Follow Up / Next Appointment:  4/18/2025

## 2025-04-18 ENCOUNTER — APPOINTMENT (OUTPATIENT)
Dept: BEHAVIORAL HEALTH | Facility: CLINIC | Age: 45
End: 2025-04-18
Payer: COMMERCIAL

## 2025-04-18 DIAGNOSIS — F33.2 SEVERE EPISODE OF RECURRENT MAJOR DEPRESSIVE DISORDER, WITHOUT PSYCHOTIC FEATURES (MULTI): Primary | ICD-10-CM

## 2025-04-18 DIAGNOSIS — F41.1 GAD (GENERALIZED ANXIETY DISORDER): ICD-10-CM

## 2025-04-18 PROCEDURE — 90837 PSYTX W PT 60 MINUTES: CPT

## 2025-04-18 NOTE — PROGRESS NOTES
"Solution-Focused Therapy Follow up Note  Session Time: 10:07 AM -11:11 AM       Patient  Radha Gomez is a 44 y.o. female, presenting for a follow-up visit.     An interactive audio and video telecommunication system which permits real time communications between the patient (at the originating site) and provider (at the distant site) was utilized to provide this telehealth service.     Verbal consent was requested and obtained from Radha Gomez on this date, 04/18/25 for a telehealth visit and the patient's location was confirmed at the time of the visit.    Session conducted virtually via HIPAA compliant video.  Patient was provided with informed consent.    Chief Complaint   Patient presents with    MDD (Major Depressive Disorder)    Anxiety     Session #24    Updates/Session Content  Pt update:  Pt thinking last night--is feeling like nothing is dangerous, or immediately actionable, but there is doom and gloom.  Where does it come from?  Pt finds that she is worried about the events going on in the country right now.    Things with  are pretty good, but there is a voice that keeps telling her that they need to talk bc they could have more intimacy, but pt second guesses herself--does her  feel the same way?  What if he doesn't?  Pt feels she could handle wherever path leads if it weren't for:  Inadequacy of me, worry about dad, worry about  as a person, environment (house), society, work (seems to wish she spent more time outside of work thinking about work), managing friendships, finances, future finances, daily living \"stuff\"  (tasks).  There are things she would like to unbox--artistic, spiritual--current routine feels empty,    All of the above feel like burdens on her back that she has to resolve before tackling her relationship, bc she is not sure that she can handle everything at once.    Additionally, pt doesn't really feel worthy of having \"more\" with .    Discussion " "and homework:  Discussed importance of remembering that there is no such thing as perfection, Discussed importance of perspective and acknowledging progress, no matter how small.    Discussed importance of noting that life is a constant work in progress, so if waiting for things to resolve before having important conversations, may never have them...is pt creating and excuse to avoid a difficult conversation?  The only way pt can find the answers she is looking for is to talk.  Discussed what \"worthiness\" means to pt.    Therapy components:  identifying key behaviors and activity monitoring  Monitored dysfunctional automatic thoughts by examining the evidence, identifiying cognitive errors, and weighing pros and cons or advantages and disadvantages.  Used problem-solving strategy.    Interventions Provided: Problem Solving Treatment, Acceptance & Commitment Therapy, Interpersonal Therapy, Motivational Interviewing, Solution Focused Therapy, Strengths Exploration, Values Exploration, and Develop Coping Strategies  Assignment(s) Given  Pt agreed to complete the following assignments: Give self credit for progress, think about having conversation with .      Response to Intervention: Open, Engaged, and Receptive    Mental Status  General Appearance & Grooming: Appropriate  Behavior:  Appropriate  Mood: Normal  Sensorium/Cognition: No impairment and Alert & Oriented x 3    Additional Session Information  NA    Progress Made: Gaining Insight    Follow Up / Next Appointment:  5/16/2025  "

## 2025-04-28 ENCOUNTER — APPOINTMENT (OUTPATIENT)
Dept: PRIMARY CARE | Facility: CLINIC | Age: 45
End: 2025-04-28
Payer: COMMERCIAL

## 2025-04-28 VITALS
HEIGHT: 69 IN | OXYGEN SATURATION: 98 % | BODY MASS INDEX: 43.4 KG/M2 | TEMPERATURE: 98.1 F | SYSTOLIC BLOOD PRESSURE: 135 MMHG | WEIGHT: 293 LBS | DIASTOLIC BLOOD PRESSURE: 89 MMHG | HEART RATE: 87 BPM

## 2025-04-28 DIAGNOSIS — E03.9 ADULT HYPOTHYROIDISM: ICD-10-CM

## 2025-04-28 DIAGNOSIS — E28.2 PCOS (POLYCYSTIC OVARIAN SYNDROME): ICD-10-CM

## 2025-04-28 DIAGNOSIS — Z86.2 HISTORY OF ANEMIA: ICD-10-CM

## 2025-04-28 DIAGNOSIS — D68.51 HETEROZYGOUS FACTOR V LEIDEN MUTATION (MULTI): ICD-10-CM

## 2025-04-28 DIAGNOSIS — F33.2 SEVERE EPISODE OF RECURRENT MAJOR DEPRESSIVE DISORDER, WITHOUT PSYCHOTIC FEATURES (MULTI): ICD-10-CM

## 2025-04-28 DIAGNOSIS — E55.9 VITAMIN D DEFICIENCY: ICD-10-CM

## 2025-04-28 DIAGNOSIS — F41.8 DEPRESSION WITH ANXIETY: ICD-10-CM

## 2025-04-28 DIAGNOSIS — F41.1 GAD (GENERALIZED ANXIETY DISORDER): ICD-10-CM

## 2025-04-28 DIAGNOSIS — Z00.00 ANNUAL PHYSICAL EXAM: Primary | ICD-10-CM

## 2025-04-28 DIAGNOSIS — D68.59 HYPERCOAGULABLE STATE (MULTI): ICD-10-CM

## 2025-04-28 DIAGNOSIS — E66.01 MORBID OBESITY (MULTI): ICD-10-CM

## 2025-04-28 PROBLEM — N85.01 COMPLEX ENDOMETRIAL HYPERPLASIA WITHOUT ATYPIA: Status: ACTIVE | Noted: 2025-04-28

## 2025-04-28 PROCEDURE — 3725F SCREEN DEPRESSION PERFORMED: CPT | Performed by: NURSE PRACTITIONER

## 2025-04-28 PROCEDURE — 1036F TOBACCO NON-USER: CPT | Performed by: NURSE PRACTITIONER

## 2025-04-28 PROCEDURE — 3008F BODY MASS INDEX DOCD: CPT | Performed by: NURSE PRACTITIONER

## 2025-04-28 PROCEDURE — 99396 PREV VISIT EST AGE 40-64: CPT | Performed by: NURSE PRACTITIONER

## 2025-04-28 ASSESSMENT — PATIENT HEALTH QUESTIONNAIRE - PHQ9
SUM OF ALL RESPONSES TO PHQ9 QUESTIONS 1 AND 2: 0
1. LITTLE INTEREST OR PLEASURE IN DOING THINGS: NOT AT ALL
2. FEELING DOWN, DEPRESSED OR HOPELESS: NOT AT ALL

## 2025-04-28 ASSESSMENT — ENCOUNTER SYMPTOMS
DEPRESSION: 0
OCCASIONAL FEELINGS OF UNSTEADINESS: 0
LOSS OF SENSATION IN FEET: 0

## 2025-04-28 NOTE — PATIENT INSTRUCTIONS
Congratulations on the new position!!    Move every hour, Schedule all of your follow ups, dentist, Eye exam, Mammogram    Health Maintenance  Continue to follow a healthy diet with fruits and vegetables at most meals.  Daily exercise is recommended as well as adding weights 3 times a week to maintain muscle mass and for bone health.  It is recommended you drink 6 to 8 glasses of water daily, more when you are exerting yourself or in hot weather.  Make sure you follow the health screening guidelines and keep up to date on your immunizations!

## 2025-04-28 NOTE — PROGRESS NOTES
Subjective   Patient ID: Radha Gomez is a 44 y.o. female who presents for Annual Exam.    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 Currently taking Metformin. Would like labs to evaluate.  It was recommended she lose weight to better optimize her health and possibility of conception as well as healthy pregnancy. Has been following with Reproductive Endocrinology, IVF, which she stopped a few months ago to better focus on her health.  Referred to Nutrition in the past  has been making small changes and adding others over time       Hx of PE/DVT taking Lovenox- recently switched to Xarelto, had been using injections while undergoing IVF treatments. Since they are taking a break she chose to switch. Feels well, notes cycles are heavy the first few days. Elevates legs, wears compression socks prn. Follows with Hematology    Anxiety, depression-Feels stable, follows with a Counselor regularly, plans to get outside more now that the weather is changing. Reports she recently started a new job as a [employer] at . Reports this has been a goal of hers and she is enjoying the responsibility.  Works remotely       Remote [employer]   No kids     Diet getting better, adding more fresh foods  Caffeine coke zero  Water 48 oz x 3  Exercise no, planning to start walking again, encouraged to get up every hour and move    Non-smoker  Alcohol No      Eye exam >5   Dental exam q 6 months  Mammogram refer  Colonoscopy @46    Review of Systems  Review of Systems   Constitutional: Negative.    HENT: Negative.     Respiratory: Negative.     Cardiovascular: Negative.    Gastrointestinal: Negative.   "  Genitourinary: Negative.    Musculoskeletal: Negative.    Psychiatric/Behavioral: Negative.     All other systems reviewed and are negative.    .vsVisit Vitals  /89 (BP Location: Left arm, Patient Position: Sitting)   Pulse 87   Temp 36.7 °C (98.1 °F)   Ht 1.753 m (5' 9\")   Wt 139 kg (306 lb)   LMP 04/09/2025   SpO2 98%   BMI 45.19 kg/m²   OB Status Having periods   Smoking Status Never   BSA 2.6 m²         Objective   Physical Exam  Vitals reviewed.   Constitutional:       Appearance: Normal appearance. She is obese.   HENT:      Head: Normocephalic and atraumatic.      Right Ear: Tympanic membrane, ear canal and external ear normal.      Left Ear: Tympanic membrane, ear canal and external ear normal.      Nose: Nose normal.      Mouth/Throat:      Pharynx: Oropharynx is clear.   Eyes:      Extraocular Movements: Extraocular movements intact.      Conjunctiva/sclera: Conjunctivae normal.      Pupils: Pupils are equal, round, and reactive to light.   Cardiovascular:      Rate and Rhythm: Normal rate and regular rhythm.      Pulses: Normal pulses.      Heart sounds: Normal heart sounds.   Pulmonary:      Effort: Pulmonary effort is normal.      Breath sounds: Normal breath sounds. No wheezing, rhonchi or rales.   Abdominal:      General: Bowel sounds are normal. There is no distension.      Palpations: Abdomen is soft.      Tenderness: There is no abdominal tenderness.   Musculoskeletal:         General: No swelling or tenderness. Normal range of motion.      Cervical back: Normal range of motion and neck supple.   Skin:     General: Skin is warm and dry.      Capillary Refill: Capillary refill takes 2 to 3 seconds.   Neurological:      General: No focal deficit present.      Mental Status: She is alert and oriented to person, place, and time.   Psychiatric:         Mood and Affect: Mood normal.         Behavior: Behavior normal.         Thought Content: Thought content normal.         Judgment: Judgment " normal.         Assessment/Plan   Problem List Items Addressed This Visit       Adult hypothyroidism    Relevant Orders    TSH with reflex to Free T4 if abnormal    Depression with anxiety    Follows with Counselor regularly  Feels well  Denies SI, HI         Morbid obesity (Multi)    PCOS (polycystic ovarian syndrome)    Relevant Orders    CBC    Comprehensive Metabolic Panel    DHEA level    Sex Hormone Binding Globulin    Testosterone, total and free    Prolactin level    Vitamin D deficiency    Relevant Orders    Vitamin D 25-Hydroxy,Total (for eval of Vitamin D levels)    ELLA (generalized anxiety disorder)    Severe episode of recurrent major depressive disorder, without psychotic features (Multi)    Hypercoagulable state (Multi)    PMH DVT/PE  Recently switched from Lovenox to Xarelto  Follows with Hematology         Heterozygous factor V Leiden mutation (Multi)    Relevant Orders    CBC    Comprehensive Metabolic Panel    History of anemia    Relevant Orders    CBC    Comprehensive Metabolic Panel    Iron and TIBC    Vitamin B12    Annual physical exam - Primary    Relevant Orders    Referral to Dermatology    CBC    Comprehensive Metabolic Panel    Vitamin D 25-Hydroxy,Total (for eval of Vitamin D levels)    TSH with reflex to Free T4 if abnormal    Hemoglobin A1C    Lipid Panel    DHEA level    Iron and TIBC    Vitamin B12    Sex Hormone Binding Globulin    Testosterone, total and free    Prolactin level

## 2025-04-29 LAB
ALBUMIN SERPL-MCNC: 4.9 G/DL (ref 3.6–5.1)
ALP SERPL-CCNC: 64 U/L (ref 31–125)
ALT SERPL-CCNC: 41 U/L (ref 6–29)
ANION GAP SERPL CALCULATED.4IONS-SCNC: 13 MMOL/L (CALC) (ref 7–17)
AST SERPL-CCNC: 24 U/L (ref 10–30)
BILIRUB SERPL-MCNC: 0.9 MG/DL (ref 0.2–1.2)
BUN SERPL-MCNC: 10 MG/DL (ref 7–25)
CALCIUM SERPL-MCNC: 9.4 MG/DL (ref 8.6–10.2)
CHLORIDE SERPL-SCNC: 105 MMOL/L (ref 98–110)
CHOLEST SERPL-MCNC: 177 MG/DL
CHOLEST/HDLC SERPL: 4.9 (CALC)
CO2 SERPL-SCNC: 21 MMOL/L (ref 20–32)
CREAT SERPL-MCNC: 0.77 MG/DL (ref 0.5–0.99)
EGFRCR SERPLBLD CKD-EPI 2021: 98 ML/MIN/1.73M2
ERYTHROCYTE [DISTWIDTH] IN BLOOD BY AUTOMATED COUNT: 14.3 % (ref 11–15)
EST. AVERAGE GLUCOSE BLD GHB EST-MCNC: 94 MG/DL
EST. AVERAGE GLUCOSE BLD GHB EST-SCNC: 5.2 MMOL/L
GLUCOSE SERPL-MCNC: 96 MG/DL (ref 65–99)
HBA1C MFR BLD: 4.9 %
HCT VFR BLD AUTO: 43.1 % (ref 35–45)
HDLC SERPL-MCNC: 36 MG/DL
HGB BLD-MCNC: 14.6 G/DL (ref 11.7–15.5)
LDLC SERPL CALC-MCNC: 99 MG/DL (CALC)
MCH RBC QN AUTO: 31.8 PG (ref 27–33)
MCHC RBC AUTO-ENTMCNC: 33.9 G/DL (ref 32–36)
MCV RBC AUTO: 93.9 FL (ref 80–100)
NONHDLC SERPL-MCNC: 141 MG/DL (CALC)
PLATELET # BLD AUTO: 225 THOUSAND/UL (ref 140–400)
PMV BLD REES-ECKER: 9.9 FL (ref 7.5–12.5)
POTASSIUM SERPL-SCNC: 4.4 MMOL/L (ref 3.5–5.3)
PROT SERPL-MCNC: 6.8 G/DL (ref 6.1–8.1)
RBC # BLD AUTO: 4.59 MILLION/UL (ref 3.8–5.1)
SODIUM SERPL-SCNC: 139 MMOL/L (ref 135–146)
TRIGL SERPL-MCNC: 314 MG/DL
TSH SERPL-ACNC: 1.78 MIU/L
WBC # BLD AUTO: 6.3 THOUSAND/UL (ref 3.8–10.8)

## 2025-05-01 LAB
25(OH)D3+25(OH)D2 SERPL-MCNC: 28 NG/ML (ref 30–100)
IRON SATN MFR SERPL: 22 % (CALC) (ref 16–45)
IRON SERPL-MCNC: 79 MCG/DL (ref 40–190)
TIBC SERPL-MCNC: 359 MCG/DL (CALC) (ref 250–450)
U DHEA SERPL-MCNC: NORMAL MG/L
VIT B12 SERPL-MCNC: 610 PG/ML (ref 200–1100)

## 2025-05-16 ENCOUNTER — APPOINTMENT (OUTPATIENT)
Dept: BEHAVIORAL HEALTH | Facility: CLINIC | Age: 45
End: 2025-05-16
Payer: COMMERCIAL

## 2025-05-16 DIAGNOSIS — F33.2 SEVERE EPISODE OF RECURRENT MAJOR DEPRESSIVE DISORDER, WITHOUT PSYCHOTIC FEATURES (MULTI): Primary | ICD-10-CM

## 2025-05-16 DIAGNOSIS — F41.1 GAD (GENERALIZED ANXIETY DISORDER): ICD-10-CM

## 2025-05-16 PROCEDURE — 90837 PSYTX W PT 60 MINUTES: CPT

## 2025-05-16 NOTE — PROGRESS NOTES
"Solution-Focused Therapy Follow up Note  Session Time: 10:04 AM -11:01 AM       Patient  Radha Gomez is a 44 y.o. female, presenting for a follow-up visit.     An interactive audio and video telecommunication system which permits real time communications between the patient (at the originating site) and provider (at the distant site) was utilized to provide this telehealth service.     Verbal consent was requested and obtained from Radha Gomez on this date, 05/16/25 for a telehealth visit and the patient's location was confirmed at the time of the visit.    Session conducted virtually via HIPAA compliant video.  Patient was provided with informed consent.    Chief Complaint   Patient presents with    MDD (Major Depressive Disorder)    Anxiety     Session #25    Updates/Session Content  Pt update:  \"I think I'm pretty good.  Everything else is 'harshing my mellow'\".  Is worried about her motivation.  Feels like she has a large list of things to do, but is having a hard time getting going to get things done.  Has discovered a thing called a Miner board--to do, doing, done.  Is interested in trying this out.    Beginning of month was rough, anniversary date of mom's death, got sad, felt a little despondent.    But now, making more effort to live in the present, keep ideas to the present.  Feels that is really helping her manage her emotion, has been good for her mental health.     will be going home to [place] for a concert his mom bought him bday tickets for in June.  Pt looking forward to it, has some projects that she wants to do.    Discussion and homework:  Discussed Motivation vs. Momentum, Behavioral Activation.  Discussed pt's thoughts/fears of failure, and reframing failure as learning experiences.  Discussed pt's progress accepting grief, focusing on living in the present.    Therapy components:  identifying key behaviors and activity monitoring  Monitored dysfunctional automatic thoughts by " examining the evidence, identifiying cognitive errors, and weighing pros and cons or advantages and disadvantages.  Used problem-solving strategy.    Interventions Provided: Behavioral Activation, Motivational Interviewing, Solution Focused Therapy, Strengths Exploration, Values Exploration, and Develop Coping Strategies  Assignment(s) Given  Pt agreed to complete the following assignments: Continue to focus on living in the present, consider trying momentum as opposed to motivation...    Response to Intervention: Open, Engaged, and Receptive    Mental Status  General Appearance & Grooming: Appropriate  Behavior:  Appropriate  Mood: Normal  Sensorium/Cognition: No impairment and Alert & Oriented x 3    Additional Session Information  NA    Progress Made: Gaining Insight    Follow Up / Next Appointment:  6/20/2025

## 2025-05-26 PROCEDURE — RXMED WILLOW AMBULATORY MEDICATION CHARGE

## 2025-05-29 ENCOUNTER — PHARMACY VISIT (OUTPATIENT)
Dept: PHARMACY | Facility: CLINIC | Age: 45
End: 2025-05-29
Payer: COMMERCIAL

## 2025-06-20 ENCOUNTER — APPOINTMENT (OUTPATIENT)
Dept: BEHAVIORAL HEALTH | Facility: CLINIC | Age: 45
End: 2025-06-20
Payer: COMMERCIAL

## 2025-06-20 DIAGNOSIS — F33.2 SEVERE EPISODE OF RECURRENT MAJOR DEPRESSIVE DISORDER, WITHOUT PSYCHOTIC FEATURES (MULTI): Primary | ICD-10-CM

## 2025-06-20 DIAGNOSIS — F41.1 GAD (GENERALIZED ANXIETY DISORDER): ICD-10-CM

## 2025-06-20 PROCEDURE — 90837 PSYTX W PT 60 MINUTES: CPT

## 2025-06-20 NOTE — PROGRESS NOTES
"Solution-Focused Therapy Follow up Note  Session Time: 9:02 AM -9:59 AM       Patient  Radha Gomez is a 44 y.o. female, presenting for a follow-up visit.     An interactive audio and video telecommunication system which permits real time communications between the patient (at the originating site) and provider (at the distant site) was utilized to provide this telehealth service.     Verbal consent was requested and obtained from Radha Gomez on this date, 06/20/25 for a telehealth visit and the patient's location was confirmed at the time of the visit.    Session conducted virtually via HIPAA compliant video.  Patient was provided with informed consent.    Chief Complaint   Patient presents with    MDD (Major Depressive Disorder)    Anxiety     Session #26    Updates/Session Content  Pt update:  Feeling overwhelmed today, but overall, things have been pretty good.    Saw dad on Father's Day.  Noticed that his age is showing, he is moving more slowly...got pt thinking in constructive ways about her own health and mortality, resolving to change some things for better health.  Having a hard time getting \"motivated\", even getting her day started.  Pt has been taking it easier lately, but also been on social media too much, trying to stop.    Trying to get \"back on track\". Thinks the linchpin is getting back into her \"Witchy things\".    is in [place] for his bday.  He's bored, led them to having a good convo about how complementary they are in their relationship.  He is more grounded, she brings the activity.    Has also been getting nails done every 3 weeks.    Discussion and homework:  Discussed how pt seems to do better when she makes time for her W[employer] practices to help set her day in motion or end her day, importance of routines to help get ready for the day, wind down for the day.      Importance of self care and good health practices.    Therapy components:  identifying key behaviors and activity " "monitoring  Monitored dysfunctional automatic thoughts by examining the evidence and weighing pros and cons or advantages and disadvantages.  Used problem-solving strategy.    Interventions Provided: Problem Solving Treatment, Motivational Interviewing, Solution Focused Therapy, Strengths Exploration, Values Exploration, and Develop Coping Strategies  Assignment(s) Given  Pt agreed to complete the following assignments: Make an hour every day for \"witchy shit\".  Not sure if will be beginning of the day or end of day.  Keep up self-care.    Response to Intervention: Open, Engaged, and Receptive    Mental Status  General Appearance & Grooming: Appropriate  Behavior:  Appropriate  Mood: Normal  Sensorium/Cognition: No impairment and Alert & Oriented x 3    Additional Session Information  NA    Progress Made: Gaining Insight    Follow Up / Next Appointment:  7/25/2025  "

## 2025-07-25 ENCOUNTER — APPOINTMENT (OUTPATIENT)
Dept: BEHAVIORAL HEALTH | Facility: CLINIC | Age: 45
End: 2025-07-25
Payer: COMMERCIAL

## 2025-07-25 DIAGNOSIS — F33.2 SEVERE EPISODE OF RECURRENT MAJOR DEPRESSIVE DISORDER, WITHOUT PSYCHOTIC FEATURES (MULTI): Primary | ICD-10-CM

## 2025-07-25 DIAGNOSIS — F41.1 GAD (GENERALIZED ANXIETY DISORDER): ICD-10-CM

## 2025-07-25 PROCEDURE — 90834 PSYTX W PT 45 MINUTES: CPT

## 2025-07-25 NOTE — PROGRESS NOTES
"Solution-Focused Therapy Follow up Note  Session Time: 10:07 AM -10:56 AM       Patient  Radha Gomez is a 44 y.o. female, presenting for a follow-up visit.     An interactive audio and video telecommunication system which permits real time communications between the patient (at the originating site) and provider (at the distant site) was utilized to provide this telehealth service.     Verbal consent was requested and obtained from Radha Gomez on this date, 07/25/25 for a telehealth visit and the patient's location was confirmed at the time of the visit.    Session conducted virtually via HIPAA compliant video.  Patient was provided with informed consent.    Chief Complaint   Patient presents with    MDD (Major Depressive Disorder)    Anxiety     Session #26    Updates/Session Content  Pt update:  Pt doing \"interesting\".  Coping skills seem to be working; feels like she is not dwelling on the negative so much anymore, but can be up and down.  Example, recently finished an audio book about the difficulty of being a woman in societies of the past.  \"Took her out\" for the three days it took her to get through the story, but was fine after it ended.    Two biggest issues right now--feeling like neglecting body and finances?  But--work going well, marriage and communication going well, doing well with inside wiccan practices but wants to get better with nature part of it.    Essentially, in stasis--nothing particularly big, nothing particularly small going on, feels very even-keeled.    Discussion and homework:  Discussed pt's progress with managing mood, emotions. Discussed importance of remembering that change takes effort, even though we often wants things to be much easier.  Is pt \"neglecting\" herself, or is she just not ready to do what needs to be done to make the change?  Discussed potential power in making that distinction.    Therapy components:  identifying key behaviors and activity " monitoring  Monitored dysfunctional automatic thoughts by examining the evidence, identifiying cognitive errors, and weighing pros and cons or advantages and disadvantages.  Used problem-solving strategy.    Interventions Provided: Solution Focused Therapy, Strengths Exploration, Values Exploration, and Develop Coping Strategies  Assignment(s) Given  Pt agreed to complete the following assignments: Keep using coping mechanisms and skills that are successful. Give herself rosario.    Response to Intervention: Open, Engaged, and Receptive    Mental Status  General Appearance & Grooming: Appropriate  Behavior:  Appropriate  Mood: Normal  Sensorium/Cognition: No impairment and Alert & Oriented x 3    Additional Session Information  NA    Progress Made: Gaining Insight    Follow Up / Next Appointment:  8/22/2025

## 2025-08-18 ENCOUNTER — TELEPHONE (OUTPATIENT)
Dept: HEMATOLOGY/ONCOLOGY | Facility: CLINIC | Age: 45
End: 2025-08-18
Payer: COMMERCIAL

## 2025-08-19 ENCOUNTER — APPOINTMENT (OUTPATIENT)
Dept: HEMATOLOGY/ONCOLOGY | Facility: CLINIC | Age: 45
End: 2025-08-19
Payer: COMMERCIAL

## 2025-08-22 ENCOUNTER — APPOINTMENT (OUTPATIENT)
Dept: BEHAVIORAL HEALTH | Facility: CLINIC | Age: 45
End: 2025-08-22
Payer: COMMERCIAL

## 2025-08-22 DIAGNOSIS — F41.1 GAD (GENERALIZED ANXIETY DISORDER): ICD-10-CM

## 2025-08-22 DIAGNOSIS — F33.2 SEVERE EPISODE OF RECURRENT MAJOR DEPRESSIVE DISORDER, WITHOUT PSYCHOTIC FEATURES (MULTI): Primary | ICD-10-CM

## 2025-08-22 PROCEDURE — 90837 PSYTX W PT 60 MINUTES: CPT

## 2025-09-04 ENCOUNTER — LAB (OUTPATIENT)
Dept: LAB | Facility: CLINIC | Age: 45
End: 2025-09-04
Payer: COMMERCIAL

## 2025-09-04 DIAGNOSIS — D68.51 HETEROZYGOUS FACTOR V LEIDEN MUTATION (MULTI): ICD-10-CM

## 2025-09-04 DIAGNOSIS — D68.59 HYPERCOAGULABLE STATE (MULTI): ICD-10-CM

## 2025-09-04 DIAGNOSIS — I26.99 PULMONARY EMBOLISM, UNSPECIFIED CHRONICITY, UNSPECIFIED PULMONARY EMBOLISM TYPE, UNSPECIFIED WHETHER ACUTE COR PULMONALE PRESENT (MULTI): ICD-10-CM

## 2025-09-04 LAB
ALBUMIN SERPL BCP-MCNC: 4.6 G/DL (ref 3.4–5)
ALP SERPL-CCNC: 60 U/L (ref 33–110)
ALT SERPL W P-5'-P-CCNC: 46 U/L (ref 7–45)
ANION GAP SERPL CALC-SCNC: 15 MMOL/L (ref 10–20)
AST SERPL W P-5'-P-CCNC: 31 U/L (ref 9–39)
BASOPHILS # BLD AUTO: 0.04 X10*3/UL (ref 0–0.1)
BASOPHILS NFR BLD AUTO: 0.7 %
BILIRUB SERPL-MCNC: 0.9 MG/DL (ref 0–1.2)
BUN SERPL-MCNC: 12 MG/DL (ref 6–23)
CALCIUM SERPL-MCNC: 9.5 MG/DL (ref 8.6–10.3)
CHLORIDE SERPL-SCNC: 106 MMOL/L (ref 98–107)
CO2 SERPL-SCNC: 22 MMOL/L (ref 21–32)
CREAT SERPL-MCNC: 0.84 MG/DL (ref 0.5–1.05)
EGFRCR SERPLBLD CKD-EPI 2021: 89 ML/MIN/1.73M*2
EOSINOPHIL # BLD AUTO: 0.16 X10*3/UL (ref 0–0.7)
EOSINOPHIL NFR BLD AUTO: 2.7 %
ERYTHROCYTE [DISTWIDTH] IN BLOOD BY AUTOMATED COUNT: 15.8 % (ref 11.5–14.5)
FERRITIN SERPL-MCNC: 28 NG/ML (ref 8–150)
GLUCOSE SERPL-MCNC: 97 MG/DL (ref 74–99)
HCT VFR BLD AUTO: 39.2 % (ref 36–46)
HGB BLD-MCNC: 12.3 G/DL (ref 12–16)
IMM GRANULOCYTES # BLD AUTO: 0.02 X10*3/UL (ref 0–0.7)
IMM GRANULOCYTES NFR BLD AUTO: 0.3 % (ref 0–0.9)
IRON SATN MFR SERPL: 10 % (ref 25–45)
IRON SERPL-MCNC: 41 UG/DL (ref 35–150)
LYMPHOCYTES # BLD AUTO: 1.89 X10*3/UL (ref 1.2–4.8)
LYMPHOCYTES NFR BLD AUTO: 31.8 %
MCH RBC QN AUTO: 30.4 PG (ref 26–34)
MCHC RBC AUTO-ENTMCNC: 31.4 G/DL (ref 32–36)
MCV RBC AUTO: 97 FL (ref 80–100)
MONOCYTES # BLD AUTO: 0.42 X10*3/UL (ref 0.1–1)
MONOCYTES NFR BLD AUTO: 7.1 %
NEUTROPHILS # BLD AUTO: 3.41 X10*3/UL (ref 1.2–7.7)
NEUTROPHILS NFR BLD AUTO: 57.4 %
NRBC BLD-RTO: 0 /100 WBCS (ref 0–0)
PLATELET # BLD AUTO: 193 X10*3/UL (ref 150–450)
POTASSIUM SERPL-SCNC: 4 MMOL/L (ref 3.5–5.3)
PROT SERPL-MCNC: 6.6 G/DL (ref 6.4–8.2)
RBC # BLD AUTO: 4.05 X10*6/UL (ref 4–5.2)
SODIUM SERPL-SCNC: 139 MMOL/L (ref 136–145)
TIBC SERPL-MCNC: 396 UG/DL (ref 240–445)
UIBC SERPL-MCNC: 355 UG/DL (ref 110–370)
WBC # BLD AUTO: 5.9 X10*3/UL (ref 4.4–11.3)

## 2025-09-04 PROCEDURE — 85025 COMPLETE CBC W/AUTO DIFF WBC: CPT

## 2025-09-04 PROCEDURE — 36415 COLL VENOUS BLD VENIPUNCTURE: CPT

## 2025-09-04 PROCEDURE — 83550 IRON BINDING TEST: CPT

## 2025-09-04 PROCEDURE — 82728 ASSAY OF FERRITIN: CPT | Mod: PARLAB

## 2025-09-04 PROCEDURE — 80053 COMPREHEN METABOLIC PANEL: CPT

## 2025-09-05 ENCOUNTER — APPOINTMENT (OUTPATIENT)
Dept: BEHAVIORAL HEALTH | Facility: CLINIC | Age: 45
End: 2025-09-05
Payer: COMMERCIAL

## 2025-09-05 ENCOUNTER — OFFICE VISIT (OUTPATIENT)
Dept: HEMATOLOGY/ONCOLOGY | Facility: CLINIC | Age: 45
End: 2025-09-05
Payer: COMMERCIAL

## 2025-09-05 VITALS
TEMPERATURE: 97.5 F | OXYGEN SATURATION: 98 % | WEIGHT: 293 LBS | RESPIRATION RATE: 18 BRPM | DIASTOLIC BLOOD PRESSURE: 78 MMHG | HEART RATE: 100 BPM | SYSTOLIC BLOOD PRESSURE: 141 MMHG | BODY MASS INDEX: 46.49 KG/M2

## 2025-09-05 DIAGNOSIS — I26.99 PULMONARY EMBOLISM, UNSPECIFIED CHRONICITY, UNSPECIFIED PULMONARY EMBOLISM TYPE, UNSPECIFIED WHETHER ACUTE COR PULMONALE PRESENT (MULTI): ICD-10-CM

## 2025-09-05 DIAGNOSIS — D68.59 HYPERCOAGULABLE STATE (MULTI): ICD-10-CM

## 2025-09-05 DIAGNOSIS — D68.51 HETEROZYGOUS FACTOR V LEIDEN MUTATION (MULTI): Primary | ICD-10-CM

## 2025-09-05 PROCEDURE — 99214 OFFICE O/P EST MOD 30 MIN: CPT | Performed by: INTERNAL MEDICINE

## 2025-09-05 PROCEDURE — 1036F TOBACCO NON-USER: CPT | Performed by: INTERNAL MEDICINE

## 2025-09-05 ASSESSMENT — PAIN SCALES - GENERAL: PAINLEVEL_OUTOF10: 0-NO PAIN

## 2025-11-03 ENCOUNTER — APPOINTMENT (OUTPATIENT)
Dept: PRIMARY CARE | Facility: CLINIC | Age: 45
End: 2025-11-03
Payer: COMMERCIAL